# Patient Record
Sex: MALE | Race: WHITE | NOT HISPANIC OR LATINO | Employment: FULL TIME | URBAN - METROPOLITAN AREA
[De-identification: names, ages, dates, MRNs, and addresses within clinical notes are randomized per-mention and may not be internally consistent; named-entity substitution may affect disease eponyms.]

---

## 2017-01-23 DIAGNOSIS — Z30.9 ENCOUNTER FOR CONTRACEPTIVE MANAGEMENT, UNSPECIFIED CONTRACEPTIVE ENCOUNTER TYPE: ICD-10-CM

## 2017-01-23 LAB — SEMEN ANALYSIS P VAS PNL: ABNORMAL

## 2017-01-23 PROCEDURE — 89321 SEMEN ANAL SPERM DETECTION: CPT | Performed by: FAMILY MEDICINE

## 2017-03-20 DIAGNOSIS — Z30.2 ENCOUNTER FOR VASECTOMY: Primary | ICD-10-CM

## 2017-03-20 LAB — SEMEN ANALYSIS P VAS PNL: NORMAL

## 2017-03-20 PROCEDURE — 89321 SEMEN ANAL SPERM DETECTION: CPT | Performed by: FAMILY MEDICINE

## 2017-11-03 ENCOUNTER — ALLIED HEALTH/NURSE VISIT (OUTPATIENT)
Dept: FAMILY MEDICINE | Facility: CLINIC | Age: 37
End: 2017-11-03
Payer: COMMERCIAL

## 2017-11-03 DIAGNOSIS — Z23 NEED FOR PROPHYLACTIC VACCINATION AND INOCULATION AGAINST INFLUENZA: Primary | ICD-10-CM

## 2017-11-03 PROCEDURE — 90471 IMMUNIZATION ADMIN: CPT

## 2017-11-03 PROCEDURE — 90686 IIV4 VACC NO PRSV 0.5 ML IM: CPT

## 2017-11-03 PROCEDURE — 99207 ZZC NO CHARGE NURSE ONLY: CPT

## 2017-11-03 NOTE — MR AVS SNAPSHOT
After Visit Summary   11/3/2017    Nas Trejo    MRN: [id number]           Patient Information     Date Of Birth          1980        Visit Information        Provider Department      11/3/2017 9:10 AM [facility]        Today's Diagnoses     Need for prophylactic vaccination and inoculation against influenza    -  1       Follow-ups after your visit        Who to contact     If you have questions or need follow up information about today's clinic visit or your schedule please contact [facility] directly at [phone].  Normal or non-critical lab and imaging results will be communicated to you by AllergEasehart, letter or phone within 4 business days after the clinic has received the results. If you do not hear from us within 7 days, please contact the clinic through Invoice2go or phone. If you have a critical or abnormal lab result, we will notify you by phone as soon as possible.  Submit refill requests through Invoice2go or call your pharmacy and they will forward the refill request to us. Please allow 3 business days for your refill to be completed.          Additional Information About Your Visit        MyChart Information     Invoice2go gives you secure access to your electronic health record. If you see a primary care provider, you can also send messages to your care team and make appointments. If you have questions, please call your primary care clinic.  If you do not have a primary care provider, please call [phone] and they will assist you.        Care EveryWhere ID     This is your Care EveryWhere ID. This could be used by other organizations to access your [place] medical records  [id number]         Blood Pressure from Last 3 Encounters:   11/14/16 106/66   10/19/16 108/66   08/30/16 132/78    Weight from Last 3 Encounters:   11/14/16 171 lb (77.6 kg)   10/19/16 170 lb 3.2 oz (77.2 kg)   08/30/16 169 lb (76.7 kg)               We Performed the Following     FLU VAC, SPLIT VIRUS IM > 3 YO (QUADRIVALENT) [88984]     Vaccine Administration, Initial [85030]        Primary Care Provider Office Phone # Fax #    [facility] [phone] [address] [place] [address]        Equal Access to Services     [facility] Hospital[facility]UZ[facility] : Hadii aad ku hadasho Soomaali, waaxda luqadaha, qaybta kaalmada adeegyada, waxay charbelin hayaan adekaci sallymarybel pendleton . So [facility] [phone].    ATENCIÓN: Si habla español, tiene a goode disposición servicios gratuitos de asistencia lingüística. Maritza al [phone].    We comply with applicable federal civil rights laws and [place] laws. We do not discriminate on the basis of race, color, national origin, age, disability, sex, sexual orientation, or gender identity.            Thank you!     Thank you for choosing [facility]  for your care. Our goal is always to provide you with excellent care. Hearing back from our patients is one way we can continue to improve our services. Please take a few minutes to complete the written survey that you may receive in the mail after your visit with us. Thank you!             Your Updated Medication List - Protect others around you: Learn how to safely use, store and throw away your medicines at www.disposemymeds.org.          This list is accurate as of: 11/3/17  9:10 AM.  Always use your most recent med list.                   Brand Name Dispense Instructions for use Diagnosis    desoximetasone 0.25 % cream    TOPICORT    60 g    Apply sparingly to affected area twice daily    Eczema, unspecified type       ibuprofen 200 MG tablet    ADVIL/MOTRIN    20 tablet    Take 3 tablets by mouth every 8 hours as needed for pain.

## 2017-11-03 NOTE — PROGRESS NOTES

## 2018-10-22 ENCOUNTER — OFFICE VISIT (OUTPATIENT)
Dept: FAMILY MEDICINE | Facility: CLINIC | Age: 38
End: 2018-10-22
Payer: OTHER MISCELLANEOUS

## 2018-10-22 VITALS
RESPIRATION RATE: 18 BRPM | WEIGHT: 184.2 LBS | TEMPERATURE: 97.6 F | SYSTOLIC BLOOD PRESSURE: 112 MMHG | HEART RATE: 57 BPM | BODY MASS INDEX: 27.28 KG/M2 | DIASTOLIC BLOOD PRESSURE: 74 MMHG | OXYGEN SATURATION: 98 % | HEIGHT: 69 IN

## 2018-10-22 DIAGNOSIS — Z01.818 PREOP GENERAL PHYSICAL EXAM: Primary | ICD-10-CM

## 2018-10-22 LAB
BASOPHILS # BLD AUTO: 0 10E9/L (ref 0–0.2)
BASOPHILS NFR BLD AUTO: 0.4 %
CREAT SERPL-MCNC: 0.88 MG/DL (ref 0.66–1.25)
DIFFERENTIAL METHOD BLD: NORMAL
EOSINOPHIL # BLD AUTO: 0.2 10E9/L (ref 0–0.7)
EOSINOPHIL NFR BLD AUTO: 2.3 %
ERYTHROCYTE [DISTWIDTH] IN BLOOD BY AUTOMATED COUNT: 11.5 % (ref 10–15)
GFR SERPL CREATININE-BSD FRML MDRD: >90 ML/MIN/1.7M2
HCT VFR BLD AUTO: 45.9 % (ref 40–53)
HGB BLD-MCNC: 15.5 G/DL (ref 13.3–17.7)
LYMPHOCYTES # BLD AUTO: 2.1 10E9/L (ref 0.8–5.3)
LYMPHOCYTES NFR BLD AUTO: 29.1 %
MCH RBC QN AUTO: 30.9 PG (ref 26.5–33)
MCHC RBC AUTO-ENTMCNC: 33.8 G/DL (ref 31.5–36.5)
MCV RBC AUTO: 91 FL (ref 78–100)
MONOCYTES # BLD AUTO: 0.6 10E9/L (ref 0–1.3)
MONOCYTES NFR BLD AUTO: 8.7 %
NEUTROPHILS # BLD AUTO: 4.4 10E9/L (ref 1.6–8.3)
NEUTROPHILS NFR BLD AUTO: 59.5 %
PLATELET # BLD AUTO: 225 10E9/L (ref 150–450)
RBC # BLD AUTO: 5.02 10E12/L (ref 4.4–5.9)
WBC # BLD AUTO: 7.4 10E9/L (ref 4–11)

## 2018-10-22 PROCEDURE — 82565 ASSAY OF CREATININE: CPT | Performed by: FAMILY MEDICINE

## 2018-10-22 PROCEDURE — 36415 COLL VENOUS BLD VENIPUNCTURE: CPT | Performed by: FAMILY MEDICINE

## 2018-10-22 PROCEDURE — 85025 COMPLETE CBC W/AUTO DIFF WBC: CPT | Performed by: FAMILY MEDICINE

## 2018-10-22 PROCEDURE — 99214 OFFICE O/P EST MOD 30 MIN: CPT | Performed by: FAMILY MEDICINE

## 2018-10-22 NOTE — MR AVS SNAPSHOT
After Visit Summary   10/22/2018    Nas Trejo    MRN: [id number]           Patient Information     Date Of Birth          1980        Visit Information        Provider Department      10/22/2018 8:20 AM Gregg Medina MD [facility]        Today's Diagnoses     Preop general physical exam    -  1      Care Instructions      Before Your Surgery      Call your surgeon if there is any change in your health. This includes signs of a cold or flu (such as a sore throat, runny nose, cough, rash or fever).    Do not smoke, drink alcohol or take over the counter medicine (unless your surgeon or primary care doctor tells you to) for the 24 hours before and after surgery.    If you take prescribed drugs: Follow your doctor s orders about which medicines to take and which to stop until after surgery.    Eating and drinking prior to surgery: follow the instructions from your surgeon    Take a shower or bath the night before surgery. Use the soap your surgeon gave you to gently clean your skin. If you do not have soap from your surgeon, use your regular soap. Do not shave or scrub the surgery site.  Wear clean pajamas and have clean sheets on your bed.         Thank you for choosing [facility].  You may be receiving a survey in the mail from [facility] regarding your visit today.  Please take a few minutes to complete and return the survey to let us know how we are doing.      If you have questions or concerns, please contact us via castaclip or you can contact your care team at [phone].    Our Clinic hours are:  Monday 6:40 am  to 7:00 pm  Tuesday -Friday 6:40 am to 5:00 pm    The [place] outpatient lab hours are:  Monday - Friday 6:10 am to 4:45 pm  Saturdays 7:00 am to 11:00 am  Appointments are required, call [phone]      If you have clinical questions after hours or would like to schedule an appointment,  call the clinic at  [phone].        DIAGNOSTICS:     EKG: Not indicated due to non-vascular surgery and low risk of event (age <65 and without cardiac risk factors)  Labs Drawn and in Process:   Unresulted Labs Ordered in the Past 30 Days of this Admission     No orders found from 2018 to 10/23/2018.          No results for input(s): HGB, PLT, INR, NA, POTASSIUM, CR, A1C in the last 85888 hours.     IMPRESSION:   Reason for surgery/procedure: Nas Trejo (: 1980) presents for pre-operative evaluation assessment as requested by Dr. Chance. He requires evaluation and anesthesia risk assessment prior to undergoing surgery/procedure for treatment of Right Ankle Injury- 7/10/18 at work. Proposed Surgery/ Procedure: Right Ankle Arthroscopic repair of ankle ligament injury.     The proposed surgical procedure is considered LOW risk.    REVISED CARDIAC RISK INDEX  The patient has the following serious cardiovascular risks for perioperative complications such as (MI, PE, VFib and 3  AV Block):  No serious cardiac risks  INTERPRETATION: 0 risks: Class I (very low risk - 0.4% complication rate)    The patient has the following additional risks for perioperative complications:  No identified additional risks      ICD-10-CM    1. Preop general physical exam Z01.818        RECOMMENDATIONS:     --Patient is to take all scheduled medications on the day before surgery EXCEPT for modifications listed below.  Take no advil or aleve or aspirin from now til surgery. Tylenol is OK.   Your stomach should be empty for 8 hours before surgery.     APPROVAL GIVEN to proceed with proposed procedure, without further diagnostic evaluation          Follow-ups after your visit        Who to contact     If you have questions or need follow up information about today's clinic visit or your schedule please contact [facility] directly at [phone].  Normal or non-critical lab and imaging results will be communicated to  "you by MyChart, letter or phone within 4 business days after the clinic has received the results. If you do not hear from us within 7 days, please contact the clinic through Tacit Softwaret or phone. If you have a critical or abnormal lab result, we will notify you by phone as soon as possible.  Submit refill requests through [employer] or call your pharmacy and they will forward the refill request to us. Please allow 3 business days for your refill to be completed.          Additional Information About Your Visit        [facility] Information     [employer] gives you secure access to your electronic health record. If you see a primary care provider, you can also send messages to your care team and make appointments. If you have questions, please call your primary care clinic.  If you do not have a primary care provider, please call [phone] and they will assist you.        Care EveryWhere ID     This is your Care EveryWhere ID. This could be used by other organizations to access your [place] medical records  [id number]        Your Vitals Were     Pulse Temperature Respirations Height Pulse Oximetry BMI (Body Mass Index)    57 97.6  F (36.4  C) (Tympanic) 18 5' 9.25\" (1.759 m) 98% 27.01 kg/m2       Blood Pressure from Last 3 Encounters:   10/22/18 112/74   11/14/16 106/66   10/19/16 108/66    Weight from Last 3 Encounters:   10/22/18 184 lb 3.2 oz (83.6 kg)   11/14/16 171 lb (77.6 kg)   10/19/16 170 lb 3.2 oz (77.2 kg)              We Performed the Following     CBC with platelets differential     Creatinine        Primary Care Provider Office Phone # Fax #    Gregg Medina [phone] [address] [id number]        Equal Access to Services     DALLIN ALATORRE : Colin Aggarwal, dagoberto perea, david pappasalmada tasha, hakan sampson. So [facility] [phone].    ATENCIÓN: Si habla español, tiene a goode disposición servicios gratuitos de asistencia " lingüística. Maritza al [phone].    We comply with applicable federal civil rights laws and [place] laws. We do not discriminate on the basis of race, color, national origin, age, disability, sex, sexual orientation, or gender identity.            Thank you!     Thank you for choosing [facility]  for your care. Our goal is always to provide you with excellent care. Hearing back from our patients is one way we can continue to improve our services. Please take a few minutes to complete the written survey that you may receive in the mail after your visit with us. Thank you!             Your Updated Medication List - Protect others around you: Learn how to safely use, store and throw away your medicines at www.disposemymeds.org.          This list is accurate as of 10/22/18  9:07 AM.  Always use your most recent med list.                   Brand Name Dispense Instructions for use Diagnosis    desoximetasone 0.25 % cream    TOPICORT    60 g    Apply sparingly to affected area twice daily    Eczema, unspecified type       ibuprofen 200 MG tablet    ADVIL/MOTRIN    20 tablet    Take 3 tablets by mouth every 8 hours as needed for pain.

## 2018-10-22 NOTE — PROGRESS NOTES
[facility]  [address] [address]  [phone]  Dept: [phone]    WORK COMP   PRE-OP EVALUATION:  Today's date: 10/22/2018    Naskenzie Trejo (: 1980) presents for pre-operative evaluation assessment as requested by Dr. Chance. He requires evaluation and anesthesia risk assessment prior to undergoing surgery/procedure for treatment of Right Ankle Injury- 7/10/18 at work. Proposed Surgery/ Procedure: Right Ankle Arthroscopic repair of ankle ligament injury.       Date of Surgery/ Procedure: 10/31/2018  Time of Surgery/ Procedure: 1:45 p.m.  Hospital/Surgical Facility: [facility].   Fax number for surgical facility: [phone]  Primary Physician: Dr. Medina   Type of Anesthesia Anticipated: General    Patient has a Health Care Directive or Living Will:  NO    1. NO - Do you have a history of heart attack, stroke, stent, bypass or surgery on an artery in the head, neck, heart or legs?  2. NO - Do you ever have any pain or discomfort in your chest?  3. NO - Do you have a history of  Heart Failure?  4. NO - Are you troubled by shortness of breath when: walking on the level, up a slight hill or at night?  5. NO - Do you currently have a cold, bronchitis or other respiratory infection?  6. NO - Do you have a cough, shortness of breath or wheezing?  7. NO - Do you sometimes get pains in the calves of your legs when you walk?  8. NO - Do you or anyone in your family have previous history of blood clots?  9. NO - Do you or does anyone in your family have a serious bleeding problem such as prolonged bleeding following surgeries or cuts?  10. NO - Have you ever had problems with anemia or been told to take iron pills?  11. NO - Have you had any abnormal blood loss such as black, tarry or bloody stools, or abnormal vaginal bleeding?  12. NO - Have you ever had a blood transfusion?  13. NO - Have you or any of your relatives ever had  "problems with anesthesia?  14. NO - Do you have sleep apnea, excessive snoring or daytime drowsiness?  15. NO - Do you have any prosthetic heart valves?  16. NO - Do you have prosthetic joints?      HPI:     HPI related to upcoming procedure: see above.       See problem list for active medical problems.  Problems all longstanding and stable, except as noted/documented.  See ROS for pertinent symptoms related to these conditions.                                                                                                                                                          .    MEDICAL HISTORY:     Patient Active Problem List    Diagnosis Date Noted     Eczema, unspecified type 10/19/2016     Priority: Medium      History reviewed. No pertinent past medical history.  Past Surgical History:   Procedure Laterality Date     ENT SURGERY       HERNIA REPAIR  01/01/2006     TONSILLECTOMY       Current Outpatient Prescriptions   Medication Sig Dispense Refill     desoximetasone (TOPICORT) 0.25 % cream Apply sparingly to affected area twice daily 60 g 3     ibuprofen (ADVIL,MOTRIN) 200 MG tablet Take 3 tablets by mouth every 8 hours as needed for pain. 20 tablet 0     OTC products: None, except as noted above    No Known Allergies   Latex Allergy: NO    Social History   Substance Use Topics     Smoking status: Former Smoker     Packs/day: 0.50     Quit date: 1/1/2018     Smokeless tobacco: Former User     Alcohol use Yes      Comment: social     History   Drug Use No       REVIEW OF SYSTEMS:   CONSTITUTIONAL: NEGATIVE for fever, chills, change in weight  ENT/MOUTH: NEGATIVE for ear, mouth and throat problems  RESP: NEGATIVE for significant cough or SOB  CV: NEGATIVE for chest pain, palpitations or peripheral edema    EXAM:   /74 (BP Location: Left arm, Patient Position: Chair, Cuff Size: Adult Regular)  Pulse 57  Temp 97.6  F (36.4  C) (Tympanic)  Resp 18  Ht 5' 9.25\" (1.759 m)  Wt 184 lb 3.2 oz (83.6 kg) "  SpO2 98%  BMI 27.01 kg/m2  Exam:  GENERAL APPEARANCE: healthy, alert and no distress  EYES: EOMI,  PERRL  HENT: ear canals and TM's normal and nose and mouth without ulcers or lesions  NECK: no adenopathy, no asymmetry, masses, or scars and thyroid normal to palpation  RESP: lungs clear to auscultation - no rales, rhonchi or wheezes  CV: regular rates and rhythm, normal S1 S2, no S3 or S4 and no murmur, click or rub -  ABDOMEN:  soft, nontender, no HSM or masses and bowel sounds normal  GU_male: testicles normal without atrophy or masses, no hernias and penis normal without urethral discharge  MS:  The feet are with normal pulses and sensation.   SKIN: no suspicious lesions or rashes  NEURO: Normal strength and tone, sensory exam grossly normal, mentation intact and speech normal  PSYCH: mentation appears normal and affect normal/bright  LYMPHATICS: No axillary, cervical, inguinal, or supraclavicular nodes      DIAGNOSTICS:     EKG: Not indicated due to non-vascular surgery and low risk of event (age <65 and without cardiac risk factors)  Labs Drawn and in Process:   Unresulted Labs Ordered in the Past 30 Days of this Admission     No orders found from 2018 to 10/23/2018.          No results for input(s): HGB, PLT, INR, NA, POTASSIUM, CR, A1C in the last 11589 hours.     IMPRESSION:   Reason for surgery/procedure: Nas Trejo (: 1980) presents for pre-operative evaluation assessment as requested by Dr. Chance. He requires evaluation and anesthesia risk assessment prior to undergoing surgery/procedure for treatment of Right Ankle Injury- 7/10/18 at work. Proposed Surgery/ Procedure: Right Ankle Arthroscopic repair of ankle ligament injury.     The proposed surgical procedure is considered LOW risk.    REVISED CARDIAC RISK INDEX  The patient has the following serious cardiovascular risks for perioperative complications such as (MI, PE, VFib and 3  AV Block):  No serious cardiac  risks  INTERPRETATION: 0 risks: Class I (very low risk - 0.4% complication rate)    The patient has the following additional risks for perioperative complications:  No identified additional risks      ICD-10-CM    1. Preop general physical exam Z01.818        RECOMMENDATIONS:     --Patient is to take all scheduled medications on the day before surgery EXCEPT for modifications listed below.  Take no advil or aleve or aspirin from now til surgery. Tylenol is OK.   Your stomach should be empty for 8 hours before surgery.     APPROVAL GIVEN to proceed with proposed procedure, without further diagnostic evaluation       Signed Electronically by: Gregg Medina MD    Copy of this evaluation report is provided to requesting physician.    [facility] Preop Guidelines    Revised Cardiac Risk Index

## 2018-10-22 NOTE — PATIENT INSTRUCTIONS
Before Your Surgery      Call your surgeon if there is any change in your health. This includes signs of a cold or flu (such as a sore throat, runny nose, cough, rash or fever).    Do not smoke, drink alcohol or take over the counter medicine (unless your surgeon or primary care doctor tells you to) for the 24 hours before and after surgery.    If you take prescribed drugs: Follow your doctor s orders about which medicines to take and which to stop until after surgery.    Eating and drinking prior to surgery: follow the instructions from your surgeon    Take a shower or bath the night before surgery. Use the soap your surgeon gave you to gently clean your skin. If you do not have soap from your surgeon, use your regular soap. Do not shave or scrub the surgery site.  Wear clean pajamas and have clean sheets on your bed.         Thank you for choosing [facility].  You may be receiving a survey in the mail from [facility] regarding your visit today.  Please take a few minutes to complete and return the survey to let us know how we are doing.      If you have questions or concerns, please contact us via JoggleBug or you can contact your care team at [phone].    Our Clinic hours are:  Monday 6:40 am  to 7:00 pm  Tuesday -Friday 6:40 am to 5:00 pm    The [place] outpatient lab hours are:  Monday - Friday 6:10 am to 4:45 pm  Saturdays 7:00 am to 11:00 am  Appointments are required, call [phone]      If you have clinical questions after hours or would like to schedule an appointment,  call the clinic at [phone].        DIAGNOSTICS:     EKG: Not indicated due to non-vascular surgery and low risk of event (age <65 and without cardiac risk factors)  Labs Drawn and in Process:   Unresulted Labs Ordered in the Past 30 Days of this Admission     No orders found from 8/23/2018 to 10/23/2018.          No results for input(s): HGB, PLT, INR, NA, POTASSIUM, CR, A1C in the last 99281 hours.     IMPRESSION:    Reason for surgery/procedure: Nas Trejo (: 1980) presents for pre-operative evaluation assessment as requested by Dr. Chance. He requires evaluation and anesthesia risk assessment prior to undergoing surgery/procedure for treatment of Right Ankle Injury- 7/10/18 at work. Proposed Surgery/ Procedure: Right Ankle Arthroscopic repair of ankle ligament injury.     The proposed surgical procedure is considered LOW risk.    REVISED CARDIAC RISK INDEX  The patient has the following serious cardiovascular risks for perioperative complications such as (MI, PE, VFib and 3  AV Block):  No serious cardiac risks  INTERPRETATION: 0 risks: Class I (very low risk - 0.4% complication rate)    The patient has the following additional risks for perioperative complications:  No identified additional risks      ICD-10-CM    1. Preop general physical exam Z01.818        RECOMMENDATIONS:     --Patient is to take all scheduled medications on the day before surgery EXCEPT for modifications listed below.  Take no advil or aleve or aspirin from now til surgery. Tylenol is OK.   Your stomach should be empty for 8 hours before surgery.     APPROVAL GIVEN to proceed with proposed procedure, without further diagnostic evaluation

## 2018-10-31 ENCOUNTER — TRANSFERRED RECORDS (OUTPATIENT)
Dept: HEALTH INFORMATION MANAGEMENT | Facility: CLINIC | Age: 38
End: 2018-10-31

## 2019-03-28 ENCOUNTER — TELEPHONE (OUTPATIENT)
Dept: FAMILY MEDICINE | Facility: CLINIC | Age: 39
End: 2019-03-28

## 2019-12-09 ENCOUNTER — HEALTH MAINTENANCE LETTER (OUTPATIENT)
Age: 39
End: 2019-12-09

## 2019-12-26 ENCOUNTER — NURSE TRIAGE (OUTPATIENT)
Dept: FAMILY MEDICINE | Facility: CLINIC | Age: 39
End: 2019-12-26

## 2019-12-26 NOTE — TELEPHONE ENCOUNTER
Patient c/o upper left chest pain close to shoulder (but not shoulder per patient) on kameron holm that lasted approximately 1 hour-self resolved. Has not had symptoms since, no symptoms present at time of call. Had similar issue in the past (2015). 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 BP at home in normal range. No other symptoms.     Additional Information    Negative: Severe difficulty breathing (e.g., struggling for each breath, speaks in single words)    Negative: Passed out (i.e., fainted, collapsed and was not responding)    Negative: Chest pain lasting longer than 5 minutes and ANY of the following:* Over 50 years old* Over 30 years old and at least one cardiac risk factor (i.e., high blood pressure, diabetes, high cholesterol, obesity, smoker or strong family history of heart disease)* Pain is crushing, pressure-like, or heavy * Took nitroglycerin and chest pain was not relieved* History of heart disease (i.e., angina, heart attack, bypass surgery, angioplasty, CHF)    Negative: Visible sweat on face or sweat dripping down face    Negative: Sounds like a life-threatening emergency to the triager    Negative: SEVERE chest pain    Negative: Pain also present in shoulder(s) or arm(s) or jaw    Negative: Difficulty breathing    Negative: Cocaine use within last 3 days    Negative: History of prior 'blood clot' in leg or lungs (i.e., deep vein thrombosis, pulmonary embolism)    Negative: Recent illness requiring prolonged bed rest (i.e., immobilization)    Negative: Hip or leg fracture in past 2 months (e.g, or had cast on leg or ankle)    Negative: Major surgery in the past month    Negative: Recent long-distance travel with prolonged time in car, bus, plane, or train (i.e., within past 2 weeks; 6 or more hours duration)    Negative: Heart beating irregularly or very rapidly    Negative: Chest pain lasting longer than 5 minutes     Negative: Intermittent chest pain and pain has been increasing in severity or frequency    Negative: Dizziness or lightheadedness    Negative: Coughing up blood    Negative: Patient sounds very sick or weak to the triager    Negative: Fever > 100.5 F (38.1 C)    Negative: Intermittent chest pains persist > 3 days    Negative: All other patients with chest pain    Negative: Patient wants to be seen    Protocols used: CHEST PAIN-A-OH

## 2019-12-27 ENCOUNTER — OFFICE VISIT (OUTPATIENT)
Dept: FAMILY MEDICINE | Facility: CLINIC | Age: 39
End: 2019-12-27
Payer: COMMERCIAL

## 2019-12-27 VITALS
DIASTOLIC BLOOD PRESSURE: 78 MMHG | SYSTOLIC BLOOD PRESSURE: 120 MMHG | WEIGHT: 188.8 LBS | RESPIRATION RATE: 16 BRPM | HEART RATE: 82 BPM | BODY MASS INDEX: 27.96 KG/M2 | HEIGHT: 69 IN | OXYGEN SATURATION: 98 % | TEMPERATURE: 98.3 F

## 2019-12-27 DIAGNOSIS — R07.9 CHEST PAIN, UNSPECIFIED TYPE: Primary | ICD-10-CM

## 2019-12-27 DIAGNOSIS — L72.3 SEBACEOUS CYST: ICD-10-CM

## 2019-12-27 PROCEDURE — 99214 OFFICE O/P EST MOD 30 MIN: CPT | Performed by: FAMILY MEDICINE

## 2019-12-27 ASSESSMENT — MIFFLIN-ST. JEOR: SCORE: 1771.77

## 2019-12-27 NOTE — PROGRESS NOTES
"Subjective     Nas Jordon Trejo is a 39 year old male who presents to clinic today for the following health issues:    HPI   Chief Complaint   Patient presents with     Derm Problem     lump in left sided groin area X 3-4 weeks.      Chest Pain     ? chest muscle, chest pain pt. called and talked with triage nurse yesterday 12/26/2019 (see note)         Current Outpatient Medications:      desoximetasone (TOPICORT) 0.25 % cream, Apply sparingly to affected area twice daily, Disp: 60 g, Rfl: 3     ibuprofen (ADVIL,MOTRIN) 200 MG tablet, Take 3 tablets by mouth every 8 hours as needed for pain. (Patient not taking: Reported on 12/27/2019), Disp: 20 tablet, Rfl: 0    Patient Active Problem List   Diagnosis     Eczema, unspecified type       Blood pressure 120/78, pulse 82, temperature 98.3  F (36.8  C), temperature source Tympanic, resp. rate 16, height 1.753 m (5' 9\"), weight 85.6 kg (188 lb 12.8 oz), SpO2 98 %.    Exam:  GENERAL APPEARANCE: healthy, alert and no distress  EYES: EOMI,  PERRL  NECK: no adenopathy, no asymmetry, masses, or scars and thyroid normal to palpation  RESP: lungs clear to auscultation - no rales, rhonchi or wheezes  CV: regular rates and rhythm, normal S1 S2, no S3 or S4 and no murmur, click or rub -  MS: the ribs and the sternum are not tender. Stressing the chest muscles does not reproduce the pain.   SKIN: no suspicious lesions or rashes; except the left inguinal area there is a 1 cm sebaceous cyst and this is not inflamed.   PSYCH: mentation appears normal and affect normal/bright    (R07.9) Chest pain, unspecified type  (primary encounter diagnosis)  Comment:   Plan: Exercise Stress Test - Adult        We discussed some of the possible causes and start Aspirin at 325 mg daily. Avoid heavy exertion until after the stress test report is done.   Call [phone] or go to Cardiology now to schedule the stress test. The results will be called to you. If this is normal then other causes " will be sought.   If abnormal then you will see Cardiology.     (L72.3) Sebaceous cyst  Comment:   Plan: For the left groin area swelling, this appears as a plugged oil gland, or sebaceous cyst. Monitor for infection and if you want it excised then call our RN at [phone] and the referral to our surgeons would be done by phone.     Gregg Medina MD

## 2019-12-27 NOTE — PATIENT INSTRUCTIONS
(R07.9) Chest pain, unspecified type  (primary encounter diagnosis)  Comment:   Plan: Exercise Stress Test - Adult        We discussed some of the possible causes and start Aspirin at 325 mg daily. Avoid heavy exertion until after the stress test report is done.   Call [phone] or go to Cardiology now to schedule the stress test. The results will be called to you. If this is normal then other causes will be sought.   If abnormal then you will see Cardiology.     (L72.3) Sebaceous cyst  Comment:   Plan: For the left groin area swelling, this appears as a plugged oil gland, or sebaceous cyst. Monitor for infection and if you want it excised then call our RN at [phone] and the referral to our surgeons would be done by phone.

## 2019-12-28 ENCOUNTER — HOSPITAL ENCOUNTER (EMERGENCY)
Facility: CLINIC | Age: 39
Discharge: HOME | End: 2019-12-28
Attending: STUDENT IN AN ORGANIZED HEALTH CARE EDUCATION/TRAINING PROGRAM | Admitting: STUDENT IN AN ORGANIZED HEALTH CARE EDUCATION/TRAINING PROGRAM
Payer: COMMERCIAL

## 2019-12-28 ENCOUNTER — NURSE TRIAGE (OUTPATIENT)
Dept: NURSING | Facility: CLINIC | Age: 39
End: 2019-12-28

## 2019-12-28 ENCOUNTER — APPOINTMENT (OUTPATIENT)
Dept: GENERAL RADIOLOGY | Facility: CLINIC | Age: 39
End: 2019-12-28
Attending: STUDENT IN AN ORGANIZED HEALTH CARE EDUCATION/TRAINING PROGRAM
Payer: COMMERCIAL

## 2019-12-28 VITALS
WEIGHT: 185 LBS | HEART RATE: 67 BPM | RESPIRATION RATE: 19 BRPM | HEIGHT: 69 IN | OXYGEN SATURATION: 96 % | SYSTOLIC BLOOD PRESSURE: 123 MMHG | DIASTOLIC BLOOD PRESSURE: 77 MMHG | TEMPERATURE: 97.8 F | BODY MASS INDEX: 27.4 KG/M2

## 2019-12-28 DIAGNOSIS — R07.89 ATYPICAL CHEST PAIN: ICD-10-CM

## 2019-12-28 LAB
ALBUMIN SERPL-MCNC: 4.1 G/DL (ref 3.4–5)
ALP SERPL-CCNC: 68 U/L (ref 40–150)
ALT SERPL W P-5'-P-CCNC: 78 U/L (ref 0–70)
ANION GAP SERPL CALCULATED.3IONS-SCNC: 3 MMOL/L (ref 3–14)
AST SERPL W P-5'-P-CCNC: 65 U/L (ref 0–45)
BASOPHILS # BLD AUTO: 0.1 10E9/L (ref 0–0.2)
BASOPHILS NFR BLD AUTO: 0.6 %
BILIRUB SERPL-MCNC: 0.3 MG/DL (ref 0.2–1.3)
BUN SERPL-MCNC: 12 MG/DL (ref 7–30)
CALCIUM SERPL-MCNC: 8.8 MG/DL (ref 8.5–10.1)
CHLORIDE SERPL-SCNC: 105 MMOL/L (ref 94–109)
CO2 SERPL-SCNC: 30 MMOL/L (ref 20–32)
CREAT SERPL-MCNC: 0.72 MG/DL (ref 0.66–1.25)
D DIMER PPP FEU-MCNC: <0.3 UG/ML FEU (ref 0–0.5)
DIFFERENTIAL METHOD BLD: NORMAL
EOSINOPHIL # BLD AUTO: 0.3 10E9/L (ref 0–0.7)
EOSINOPHIL NFR BLD AUTO: 3.2 %
ERYTHROCYTE [DISTWIDTH] IN BLOOD BY AUTOMATED COUNT: 11.6 % (ref 10–15)
GFR SERPL CREATININE-BSD FRML MDRD: >90 ML/MIN/{1.73_M2}
GLUCOSE SERPL-MCNC: 89 MG/DL (ref 70–99)
HCT VFR BLD AUTO: 44.5 % (ref 40–53)
HGB BLD-MCNC: 15 G/DL (ref 13.3–17.7)
IMM GRANULOCYTES # BLD: 0 10E9/L (ref 0–0.4)
IMM GRANULOCYTES NFR BLD: 0.2 %
LYMPHOCYTES # BLD AUTO: 3.2 10E9/L (ref 0.8–5.3)
LYMPHOCYTES NFR BLD AUTO: 36.5 %
MCH RBC QN AUTO: 31.1 PG (ref 26.5–33)
MCHC RBC AUTO-ENTMCNC: 33.7 G/DL (ref 31.5–36.5)
MCV RBC AUTO: 92 FL (ref 78–100)
MONOCYTES # BLD AUTO: 0.7 10E9/L (ref 0–1.3)
MONOCYTES NFR BLD AUTO: 8 %
NEUTROPHILS # BLD AUTO: 4.6 10E9/L (ref 1.6–8.3)
NEUTROPHILS NFR BLD AUTO: 51.5 %
NRBC # BLD AUTO: 0 10*3/UL
NRBC BLD AUTO-RTO: 0 /100
PLATELET # BLD AUTO: 268 10E9/L (ref 150–450)
POTASSIUM SERPL-SCNC: 5.1 MMOL/L (ref 3.4–5.3)
PROT SERPL-MCNC: 7.8 G/DL (ref 6.8–8.8)
RBC # BLD AUTO: 4.83 10E12/L (ref 4.4–5.9)
SODIUM SERPL-SCNC: 138 MMOL/L (ref 133–144)
TROPONIN I SERPL-MCNC: <0.015 UG/L (ref 0–0.04)
WBC # BLD AUTO: 8.8 10E9/L (ref 4–11)

## 2019-12-28 PROCEDURE — 84484 ASSAY OF TROPONIN QUANT: CPT | Performed by: STUDENT IN AN ORGANIZED HEALTH CARE EDUCATION/TRAINING PROGRAM

## 2019-12-28 PROCEDURE — 85379 FIBRIN DEGRADATION QUANT: CPT | Performed by: STUDENT IN AN ORGANIZED HEALTH CARE EDUCATION/TRAINING PROGRAM

## 2019-12-28 PROCEDURE — 25000132 ZZH RX MED GY IP 250 OP 250 PS 637: Performed by: STUDENT IN AN ORGANIZED HEALTH CARE EDUCATION/TRAINING PROGRAM

## 2019-12-28 PROCEDURE — 80053 COMPREHEN METABOLIC PANEL: CPT | Performed by: STUDENT IN AN ORGANIZED HEALTH CARE EDUCATION/TRAINING PROGRAM

## 2019-12-28 PROCEDURE — 85025 COMPLETE CBC W/AUTO DIFF WBC: CPT | Performed by: STUDENT IN AN ORGANIZED HEALTH CARE EDUCATION/TRAINING PROGRAM

## 2019-12-28 PROCEDURE — 93010 ELECTROCARDIOGRAM REPORT: CPT | Mod: Z6 | Performed by: STUDENT IN AN ORGANIZED HEALTH CARE EDUCATION/TRAINING PROGRAM

## 2019-12-28 PROCEDURE — 71046 X-RAY EXAM CHEST 2 VIEWS: CPT

## 2019-12-28 PROCEDURE — 93005 ELECTROCARDIOGRAM TRACING: CPT | Performed by: STUDENT IN AN ORGANIZED HEALTH CARE EDUCATION/TRAINING PROGRAM

## 2019-12-28 PROCEDURE — 99285 EMERGENCY DEPT VISIT HI MDM: CPT | Mod: 25 | Performed by: STUDENT IN AN ORGANIZED HEALTH CARE EDUCATION/TRAINING PROGRAM

## 2019-12-28 RX ORDER — ASPIRIN 81 MG/1
324 TABLET, CHEWABLE ORAL ONCE
Status: COMPLETED | OUTPATIENT
Start: 2019-12-28 | End: 2019-12-28

## 2019-12-28 RX ADMIN — ASPIRIN 81 MG 324 MG: 81 TABLET ORAL at 22:27

## 2019-12-28 ASSESSMENT — MIFFLIN-ST. JEOR: SCORE: 1754.53

## 2019-12-28 NOTE — ED AVS SNAPSHOT
[facility]  [address] [address]  Phone:  [phone]  Fax:  [phone]                                    Nas Trejo   MRN: [id number]    Department:  [facility]   Date of Visit:  12/28/2019           After Visit Summary Signature Page    I have received my discharge instructions, and my questions have been answered. I have discussed any challenges I see with this plan with the nurse or doctor.    ..........................................................................................................................................  Patient/Patient Representative Signature      ..........................................................................................................................................  Patient Representative Print Name and Relationship to Patient    ..................................................               ................................................  Date                                   Time    ..........................................................................................................................................  Reviewed by Signature/Title    ...................................................              ..............................................  Date                                               Time          22EPIC Rev 08/18

## 2019-12-29 NOTE — ED PROVIDER NOTES
History     Chief Complaint   Patient presents with     Chest Pain     HPI  Nas Jordon Trejo is a 39 year old male who resents department for evaluation of intermittent left-sided chest discomfort several days duration.  Patient states that symptoms began initially on 2019, have been present nearly every day at some point in time but often only lasting minutes to hours.  The pain is described as sharp and achy but not reproducible with range of motion or deep inspiration.  As fever, chills, cough, shortness of breath, back pain, substernal chest pressure, abdominal pain, nausea or vomiting.  This evening his symptoms were associated with belching, he took OTC Maalox with relief from the belching but his sharp left-sided chest discomfort persisted.  No known history of CAD or VTE.    Allergies:  No Known Allergies    Problem List:    Patient Active Problem List    Diagnosis Date Noted     Eczema, unspecified type 10/19/2016     Priority: Medium        Past Medical History:    No past medical history on file.    Past Surgical History:    Past Surgical History:   Procedure Laterality Date     ENT SURGERY       HERNIA REPAIR  2006     TONSILLECTOMY         Family History:    Family History   Problem Relation Age of Onset     Diabetes Father         type 2     Thyroid Disease Sister        Social History:  Marital Status:   [2]  Social History     Tobacco Use     Smoking status: Former Smoker     Packs/day: 0.50     Last attempt to quit: 2018     Years since quittin.9     Smokeless tobacco: Current User     Types: Chew   Substance Use Topics     Alcohol use: Yes     Comment: social     Drug use: No        Medications:    desoximetasone (TOPICORT) 0.25 % cream  ibuprofen (ADVIL,MOTRIN) 200 MG tablet          Review of Systems  Constitutional:  Negative for fever or recent illness.  Cardiovascular: Positive for intermittent sharp left-sided chest pain.  Respiratory:  Negative for  "cough or shortness of breath.  Gastrointestinal:  Negative for abdominal pain, nausea or vomiting.  Musculoskeletal: Negative for neck or back pain.  Neurological:  Negative for headache or dizziness.    All others reviewed and are negative.      Physical Exam   BP: (!) 160/94  Pulse: 81  Heart Rate: 76  Temp: 97.8  F (36.6  C)  Resp: 18  Height: 175.3 cm (5' 9\")  Weight: 83.9 kg (185 lb)  SpO2: 99 %      Physical Exam  Constitutional:  Well developed, well nourished.  Appears nontoxic and in no acute distress.    HENT:  Normocephalic and atraumatic.  Symmetric in appearance.  Eyes:  Conjunctivae are normal.  Neck:  Neck supple.  Cardiovascular:  No cyanosis.  RRR.  No audible murmurs noted.  No lower extremity edema or asymmetry.   Respiratory:  Effort normal without sign of respiratory distress.  CTAB without diminished regions.    Gastrointestinal:  Soft nondistended abdomen.  Nontender and without guarding.  No rigidity or rebound tenderness.  Negative Das's sign.  Negative McBurney's point.    Musculoskeletal:  Moves extremities spontaneously.  Neurological:  Patient is alert.  Skin:  Skin is warm and dry.  Psychiatric:  Normal mood and affect.      ED Course        Procedures                 EKG Interpretation:      Interpreted by: Fox Castillo  Time reviewed: Upon completion    Symptoms at time of EKG: Sharp chest discomfort  Rhythm: Sinus  Rate: Normal  Axis: Normal    Conduction: None atypical   ST Segments/ T Waves: No pathologic ST-elevations or T-wave abnormalities.  Q Waves: None  Comparison to prior: Similar morphology to previous     Clinical Impression: No sign of ischemia       Critical Care time:  none               Results for orders placed or performed during the hospital encounter of 12/28/19 (from the past 24 hour(s))   CBC with platelets differential   Result Value Ref Range    WBC 8.8 4.0 - 11.0 10e9/L    RBC Count 4.83 4.4 - 5.9 10e12/L    Hemoglobin 15.0 13.3 - 17.7 g/dL    Hematocrit " 44.5 40.0 - 53.0 %    MCV 92 78 - 100 fl    MCH 31.1 26.5 - 33.0 pg    MCHC 33.7 31.5 - 36.5 g/dL    RDW 11.6 10.0 - 15.0 %    Platelet Count 268 150 - 450 10e9/L    Diff Method Automated Method     % Neutrophils 51.5 %    % Lymphocytes 36.5 %    % Monocytes 8.0 %    % Eosinophils 3.2 %    % Basophils 0.6 %    % Immature Granulocytes 0.2 %    Nucleated RBCs 0 0 /100    Absolute Neutrophil 4.6 1.6 - 8.3 10e9/L    Absolute Lymphocytes 3.2 0.8 - 5.3 10e9/L    Absolute Monocytes 0.7 0.0 - 1.3 10e9/L    Absolute Eosinophils 0.3 0.0 - 0.7 10e9/L    Absolute Basophils 0.1 0.0 - 0.2 10e9/L    Abs Immature Granulocytes 0.0 0 - 0.4 10e9/L    Absolute Nucleated RBC 0.0    Comprehensive metabolic panel   Result Value Ref Range    Sodium 138 133 - 144 mmol/L    Potassium 5.1 3.4 - 5.3 mmol/L    Chloride 105 94 - 109 mmol/L    Carbon Dioxide 30 20 - 32 mmol/L    Anion Gap 3 3 - 14 mmol/L    Glucose 89 70 - 99 mg/dL    Urea Nitrogen 12 7 - 30 mg/dL    Creatinine 0.72 0.66 - 1.25 mg/dL    GFR Estimate >90 >60 mL/min/[1.73_m2]    GFR Estimate If Black >90 >60 mL/min/[1.73_m2]    Calcium 8.8 8.5 - 10.1 mg/dL    Bilirubin Total 0.3 0.2 - 1.3 mg/dL    Albumin 4.1 3.4 - 5.0 g/dL    Protein Total 7.8 6.8 - 8.8 g/dL    Alkaline Phosphatase 68 40 - 150 U/L    ALT 78 (H) 0 - 70 U/L    AST 65 (H) 0 - 45 U/L   Troponin I   Result Value Ref Range    Troponin I ES <0.015 0.000 - 0.045 ug/L   D dimer quantitative   Result Value Ref Range    D Dimer <0.3 0.0 - 0.50 ug/ml FEU   XR Chest 2 Views    Narrative    XR CHEST 2 VW   12/28/2019 10:59 PM     HISTORY: Sharp left-sided pain.    COMPARISON: 10/5/2015.    FINDINGS: The heart size is normal. The lungs are clear. No  pneumothorax or pleural effusion.      Impression    IMPRESSION: No acute abnormality.    EDENILSON PAYTON MD       Medications   aspirin (ASA) chewable tablet 324 mg (324 mg Oral Given 12/28/19 0144)       Assessments & Plan (with Medical Decision Making)   Nas Dahl  Neil is a 39 year old male who presents to the department for evaluation of several days of intermittent left-sided chest discomfort.  The pain is described as sharp and achy, nonradiating but pleuritic.  There is no exertional component, atypical for ACS and troponin within reference range despite duration of symptoms.  He is without known history of VTE or signs of DVT, d-dimer within reference range.  Chest radiograph independently reviewed and without sign of pneumothorax or widened mediastinum.  Low suspicion for etiology such as pulmonary embolism, aortic dissection, pericardial effusion, pleural effusion, pneumonia or infectious process.  Symptoms could represent pleurisy precordial catch syndrome, recommend OTC NSAIDs as directed as well as follow through with previously ordered stress testing and clinic appointment.  He is in agreement discharge line including return instructions.        Disclaimer:  This note consists of symbols derived from keyboarding, dictation, and/or voice recognition software.  As a result, there may be errors in the script that have gone undetected.  Please consider this when interpreting information found in the chart.        I have reviewed the nursing notes.    I have reviewed the findings, diagnosis, plan and need for follow up with the patient.       Discharge Medication List as of 12/28/2019 11:43 PM          Final diagnoses:   Atypical chest pain       12/28/2019   [facility] EMERGENCY DEPARTMENT     Fox Castillo,   12/29/19 0122

## 2019-12-29 NOTE — TELEPHONE ENCOUNTER
Pt c/o heartburn sensation w/ a lot of burping today. Saw PCP yesterday in clinic for chest discomfort (see 12/27 OV note) Cardiac stress test scheduled Jan 7. He denies any SOB, weakness, lightheadedness, nausea or vomiting. Says this upper abdomen/chest pain is constant but no worse since visit. Only change is the frequent burping. Pt asks if there is some type of antacid he could try to relieve this. Advised try Maalox, Mylanta or Tums now but if no relief w/ this then see provider w/i 4 hrs (would have to be ED as it is Sat night) Pt voiced understanding and agreement.        Reason for Disposition    [1] MILD-MODERATE pain AND [2] constant AND [3] present > 2 hours    Additional Information    Negative: Severe difficulty breathing (e.g., struggling for each breath, speaks in single words)    Negative: Shock suspected (e.g., cold/pale/clammy skin, too weak to stand, low BP, rapid pulse)    Negative: Difficult to awaken or acting confused (e.g., disoriented, slurred speech)    Negative: Passed out (i.e., lost consciousness, collapsed and was not responding)    Negative: Visible sweat on face or sweat dripping down face    Negative: Sounds like a life-threatening emergency to the triager    Negative: Followed an abdomen (stomach) injury    Negative: Chest pain    Negative: [1] SEVERE pain (e.g., excruciating) AND [2] present > 1 hour    Negative: [1] Pain lasts > 10 minutes AND [2] age > 50    Negative: [1] Pain lasts > 10 minutes AND [2] age > 40 AND [3] associated chest, arm, neck, upper back or jaw pain    Negative: [1] Pain lasts > 10 minutes AND [2] age > 35 AND [3] at least one cardiac risk factor (i.e., hypertension, diabetes, obesity, smoker or strong family history of heart disease)    Negative: [1] Pain lasts > 10 minutes AND [2] history of heart disease (i.e., heart attack, bypass surgery, angina, angioplasty, CHF; not just a heart murmur)    Negative: [1] Pain lasts > 10 minutes AND [2] difficulty  "breathing    Negative: [1] Vomiting AND [2] contains red blood  (Exception: few streaks and only occurred once)    Negative: [1] Vomiting AND [2] contains black (\"coffee ground\") material    Negative: Blood in bowel movements  (Exception: Blood on surface of BM with constipation)    Negative: Black or tarry bowel movements  (Exception: chronic-unchanged  black-grey bowel movements AND is taking iron pills or Pepto-bismol)    Negative: [1] Pregnant > 24 weeks AND [2] hand or face swelling    Negative: Patient sounds very sick or weak to the triager    Protocols used: ABDOMINAL PAIN - UPPER-A-AH      "

## 2019-12-29 NOTE — ED TRIAGE NOTES
"Has had intermittent L sided chest pain since 12/24     Was seen in clinic on 12/26 has stress test scheduled    Today states \"just haven't been feeling right\" pain has been constant all day    Is in no distress    Denies any symptoms aside form pain  "

## 2020-01-07 ENCOUNTER — HOSPITAL ENCOUNTER (OUTPATIENT)
Dept: CARDIOLOGY | Facility: CLINIC | Age: 40
Discharge: HOME | End: 2020-01-07
Attending: FAMILY MEDICINE | Admitting: FAMILY MEDICINE
Payer: COMMERCIAL

## 2020-01-07 DIAGNOSIS — R07.9 CHEST PAIN, UNSPECIFIED TYPE: ICD-10-CM

## 2020-01-07 PROCEDURE — 93016 CV STRESS TEST SUPVJ ONLY: CPT | Performed by: FAMILY MEDICINE

## 2020-01-07 PROCEDURE — 93017 CV STRESS TEST TRACING ONLY: CPT

## 2020-01-07 PROCEDURE — 93018 CV STRESS TEST I&R ONLY: CPT | Performed by: FAMILY MEDICINE

## 2020-10-16 ENCOUNTER — OFFICE VISIT (OUTPATIENT)
Dept: FAMILY MEDICINE | Facility: CLINIC | Age: 40
End: 2020-10-16
Payer: COMMERCIAL

## 2020-10-16 VITALS
RESPIRATION RATE: 16 BRPM | TEMPERATURE: 98 F | DIASTOLIC BLOOD PRESSURE: 66 MMHG | OXYGEN SATURATION: 100 % | HEIGHT: 70 IN | HEART RATE: 76 BPM | BODY MASS INDEX: 27.46 KG/M2 | SYSTOLIC BLOOD PRESSURE: 126 MMHG | WEIGHT: 191.8 LBS

## 2020-10-16 DIAGNOSIS — Z23 NEED FOR PROPHYLACTIC VACCINATION AND INOCULATION AGAINST INFLUENZA: ICD-10-CM

## 2020-10-16 DIAGNOSIS — F41.1 GENERALIZED ANXIETY DISORDER: ICD-10-CM

## 2020-10-16 DIAGNOSIS — F32.0 MILD MAJOR DEPRESSION (H): Primary | ICD-10-CM

## 2020-10-16 PROCEDURE — 99214 OFFICE O/P EST MOD 30 MIN: CPT | Mod: 25 | Performed by: FAMILY MEDICINE

## 2020-10-16 PROCEDURE — 90471 IMMUNIZATION ADMIN: CPT | Performed by: FAMILY MEDICINE

## 2020-10-16 PROCEDURE — 90686 IIV4 VACC NO PRSV 0.5 ML IM: CPT | Performed by: FAMILY MEDICINE

## 2020-10-16 RX ORDER — CITALOPRAM HYDROBROMIDE 10 MG/1
10 TABLET ORAL DAILY
Qty: 30 TABLET | Refills: 1 | Status: SHIPPED | OUTPATIENT
Start: 2020-10-16 | End: 2020-12-04

## 2020-10-16 ASSESSMENT — MIFFLIN-ST. JEOR: SCORE: 1796.25

## 2020-10-16 ASSESSMENT — ANXIETY QUESTIONNAIRES
3. WORRYING TOO MUCH ABOUT DIFFERENT THINGS: SEVERAL DAYS
1. FEELING NERVOUS, ANXIOUS, OR ON EDGE: SEVERAL DAYS
IF YOU CHECKED OFF ANY PROBLEMS ON THIS QUESTIONNAIRE, HOW DIFFICULT HAVE THESE PROBLEMS MADE IT FOR YOU TO DO YOUR WORK, TAKE CARE OF THINGS AT HOME, OR GET ALONG WITH OTHER PEOPLE: SOMEWHAT DIFFICULT
7. FEELING AFRAID AS IF SOMETHING AWFUL MIGHT HAPPEN: NOT AT ALL
2. NOT BEING ABLE TO STOP OR CONTROL WORRYING: SEVERAL DAYS
GAD7 TOTAL SCORE: 6
5. BEING SO RESTLESS THAT IT IS HARD TO SIT STILL: NOT AT ALL
6. BECOMING EASILY ANNOYED OR IRRITABLE: MORE THAN HALF THE DAYS

## 2020-10-16 ASSESSMENT — PATIENT HEALTH QUESTIONNAIRE - PHQ9
SUM OF ALL RESPONSES TO PHQ QUESTIONS 1-9: 7
5. POOR APPETITE OR OVEREATING: SEVERAL DAYS

## 2020-10-16 NOTE — PATIENT INSTRUCTIONS
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 Avoid caffeine and other stimulants. Exercise daily. If counseling is desired, then call our RN at [phone] and I will make the referral.   For medication Celexa at 10 mg daily will be started. Call if any side effects. This will take several days to start working and several weeks for the max effect.   If doing well then recheck in clinic in about 4 weeks.     (F41.1) Generalized anxiety disorder  Comment:   Plan: citalopram (CELEXA) 10 MG tablet        See above.

## 2020-10-16 NOTE — PROGRESS NOTES
"Subjective     Nas Jordon Trejo is a 40 year old male who presents to clinic today for the following health issues:    HPI         Abnormal Mood Symptoms  Onset/Duration: ongoing 10 + years. Was on a medication after he got out of the . Not sure what it was.  Description: Anxiety  Depression (if yes, do PHQ-9): not sure  Anxiety (if yes, do COY-7): YES  Accompanying Signs & Symptoms:  Still participating in activities that you used to enjoy: YES  Fatigue: no  Irritability: YES- sometimes  Difficulty concentrating: no  Changes in appetite: YES- decreased a little bit  Problems with sleep: no  Heart racing/beating fast: YES- kind of not sure  Abnormally elevated, expansive, or irritable mood: no  Persistently increased activity or energy: no  Thoughts of hurting yourself or others: no  History:  Recent stress or major life event: YES- busy at work in the [employer]  Prior depression or anxiety: Yes- Anxiety  Family history of depression or anxiety: no  Alcohol/drug use: YES- alcohol  Difficulty sleeping: no  Precipitating or alleviating factors: None  Therapies tried and outcome: many years ago he was on a med for about one year. He prefers to not use medication.   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     Today the PHQ is 7, and the COY is 6.     Current Outpatient Medications   Medication Instructions     desoximetasone (TOPICORT) 0.25 % cream Apply sparingly to affected area twice daily     ibuprofen (ADVIL/MOTRIN) 600 mg, Oral, EVERY 8 HOURS PRN       Patient Active Problem List   Diagnosis     Eczema, unspecified type     Blood pressure 126/66, pulse 76, temperature 98  F (36.7  C), temperature source Tympanic, resp. rate 16, height 1.778 m (5' 10\"), weight 87 kg (191 lb 12.8 oz), SpO2 100 %.    Exam:  GENERAL APPEARANCE: healthy, alert and no distress  SKIN: no suspicious lesions or rashes  PSYCH: mentation appears normal and affect normal/bright    (F32.0) Mild major " depression (H)  (primary encounter diagnosis)  Comment:   Plan: citalopram (CELEXA) 10 MG tablet        We discussed the non drug therapies. Avoid caffeine and other stimulants. Exercise daily. If counseling is desired, then call our RN at [phone] and I will make the referral.   For medication Celexa at 10 mg daily will be started. Call if any side effects. This will take several days to start working and several weeks for the max effect.   If doing well then recheck in clinic in about 4 weeks.     (F41.1) Generalized anxiety disorder  Comment:   Plan: citalopram (CELEXA) 10 MG tablet        See above.       Gregg Medina MD

## 2020-10-17 ASSESSMENT — ANXIETY QUESTIONNAIRES: GAD7 TOTAL SCORE: 6

## 2020-12-04 ENCOUNTER — OFFICE VISIT (OUTPATIENT)
Dept: FAMILY MEDICINE | Facility: CLINIC | Age: 40
End: 2020-12-04
Payer: COMMERCIAL

## 2020-12-04 VITALS
OXYGEN SATURATION: 97 % | HEART RATE: 70 BPM | RESPIRATION RATE: 16 BRPM | SYSTOLIC BLOOD PRESSURE: 110 MMHG | TEMPERATURE: 97.9 F | HEIGHT: 70 IN | DIASTOLIC BLOOD PRESSURE: 72 MMHG | WEIGHT: 190 LBS | BODY MASS INDEX: 27.2 KG/M2

## 2020-12-04 DIAGNOSIS — L30.9 ECZEMA, UNSPECIFIED TYPE: ICD-10-CM

## 2020-12-04 DIAGNOSIS — F32.0 MILD MAJOR DEPRESSION (H): ICD-10-CM

## 2020-12-04 DIAGNOSIS — F41.1 GENERALIZED ANXIETY DISORDER: ICD-10-CM

## 2020-12-04 PROCEDURE — 99214 OFFICE O/P EST MOD 30 MIN: CPT | Performed by: FAMILY MEDICINE

## 2020-12-04 RX ORDER — CITALOPRAM HYDROBROMIDE 10 MG/1
10 TABLET ORAL DAILY
Qty: 30 TABLET | Refills: 4 | Status: SHIPPED | OUTPATIENT
Start: 2020-12-04 | End: 2021-03-18

## 2020-12-04 RX ORDER — DESOXIMETASONE 2.5 MG/G
CREAM TOPICAL
Qty: 60 G | Refills: 3 | Status: SHIPPED | OUTPATIENT
Start: 2020-12-04

## 2020-12-04 ASSESSMENT — ANXIETY QUESTIONNAIRES
7. FEELING AFRAID AS IF SOMETHING AWFUL MIGHT HAPPEN: NOT AT ALL
1. FEELING NERVOUS, ANXIOUS, OR ON EDGE: SEVERAL DAYS
5. BEING SO RESTLESS THAT IT IS HARD TO SIT STILL: NOT AT ALL
2. NOT BEING ABLE TO STOP OR CONTROL WORRYING: NOT AT ALL
6. BECOMING EASILY ANNOYED OR IRRITABLE: NOT AT ALL
GAD7 TOTAL SCORE: 1
3. WORRYING TOO MUCH ABOUT DIFFERENT THINGS: NOT AT ALL

## 2020-12-04 ASSESSMENT — MIFFLIN-ST. JEOR: SCORE: 1788.08

## 2020-12-04 ASSESSMENT — PATIENT HEALTH QUESTIONNAIRE - PHQ9
5. POOR APPETITE OR OVEREATING: NOT AT ALL
SUM OF ALL RESPONSES TO PHQ QUESTIONS 1-9: 2

## 2020-12-04 NOTE — PROGRESS NOTES
Subjective     Nas Jordon Trejo is a 40 year old male who presents to clinic today for the following health issues:    HPI         Depression and Anxiety Follow-Up    How are you doing with your depression since your last visit? Improved     How are you doing with your anxiety since your last visit?  Improved     Are you having other symptoms that might be associated with depression or anxiety? No    Have you had a significant life event? OTHER: Just has been a hard year.     Do you have any concerns with your use of alcohol or other drugs? No    Social History     Tobacco Use     Smoking status: Former Smoker     Packs/day: 0.50     Quit date: 2018     Years since quittin.9     Smokeless tobacco: Current User     Types: Chew   Substance Use Topics     Alcohol use: Yes     Comment: social     Drug use: No     PHQ 10/16/2020   PHQ-9 Total Score 7   Q9: Thoughts of better off dead/self-harm past 2 weeks Not at all     COY-7 SCORE 10/16/2020   Total Score 6     Last PHQ-9 10/16/2020   1.  Little interest or pleasure in doing things 1   2.  Feeling down, depressed, or hopeless 2   3.  Trouble falling or staying asleep, or sleeping too much 1   4.  Feeling tired or having little energy 1   5.  Poor appetite or overeating 1   6.  Feeling bad about yourself 1   7.  Trouble concentrating 0   8.  Moving slowly or restless 0   Q9: Thoughts of better off dead/self-harm past 2 weeks 0   PHQ-9 Total Score 7   Difficulty at work, home, or with people Somewhat difficult     COY-7  10/16/2020   1. Feeling nervous, anxious, or on edge 1   2. Not being able to stop or control worrying 1   3. Worrying too much about different things 1   4. Trouble relaxing 1   5. Being so restless that it is hard to sit still 0   6. Becoming easily annoyed or irritable 2   7. Feeling afraid, as if something awful might happen 0   COY-7 Total Score 6   If you checked any problems, how difficult have they made it for you to do your  "work, take care of things at home, or get along with other people? Somewhat difficult       Suicide Assessment Five-step Evaluation and Treatment (SAFE-T)      How many servings of fruits and vegetables do you eat daily?  1-2 daily.    On average, how many sweetened beverages do you drink each day (Examples: soda, juice, sweet tea, etc.  Do NOT count diet or artificially sweetened beverages)?   0    How many days per week do you exercise enough to make your heart beat faster? 5-6    How many minutes a day do you exercise enough to make your heart beat faster? 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    How many days per week do you miss taking your medication? 0    Medication Followup of Eczema    Taking Medication as prescribed: Yes, he will use this more in the Winter when his skin is dry.    Side Effects:  None    Medication Helping Symptoms:  yes     FORM:  He has a form for work that he would like to discuss about.      Current Outpatient Medications   Medication Instructions     citalopram (CELEXA) 10 mg, Oral, DAILY     desoximetasone (TOPICORT) 0.25 % cream Apply sparingly to affected area twice daily       Patient Active Problem List   Diagnosis     Eczema, unspecified type       Blood pressure 110/72, pulse 70, temperature 97.9  F (36.6  C), temperature source Tympanic, resp. rate 16, height 1.778 m (5' 10\"), weight 86.2 kg (190 lb), SpO2 97 %.    Exam:  GENERAL APPEARANCE: healthy, alert and no distress  SKIN: no suspicious lesions or rashes  PSYCH: mentation appears normal and affect normal/bright      (F32.0) Mild major depression (H)  Comment:   Plan: citalopram (CELEXA) 10 MG tablet        We discussed the issues and you are doing very well!!  Stay on the same dose of the med, and use the non drug therapies.   The length of therapy is 6 months minimum, and refills are done til May, 2021.   If doing well then recheck at that time. Call sooner if any worse.     (F41.1) Generalized anxiety disorder  Comment:   Plan: " citalopram (CELEXA) 10 MG tablet        See above. Avoid stimulants.     (L30.9) Eczema, unspecified type  Comment:   Plan: desoximetasone (TOPICORT) 0.25 % external cream        Use as needed in a thin film twice daily for the shortest time.   Refills are done.       Gregg Medina MD

## 2020-12-04 NOTE — PATIENT INSTRUCTIONS
Thank you for choosing [facility]  You may be receiving an email and/or telephone survey request from [facility] [employer] regarding your visit today.  Please take a few minutes to respond to the survey to let us know how we are doing.      If you have questions or concerns, please contact us via [employer] or you can contact your care team at [phone].    Our Clinic hours are:  Monday 6:40 am  to 7:00 pm  Tuesday -Friday 6:40 am to 5:00 pm    The [place] outpatient lab hours are:  Monday - Friday 6:10 am to 4:45 pm  Saturdays 7:00 am to 11:00 am  Appointments are required, call [phone]    If you have clinical questions after hours or would like to schedule an appointment,  call the clinic at [phone].      (F32.0) Mild major depression (H)  Comment:   Plan: citalopram (CELEXA) 10 MG tablet        We discussed the issues and you are doing very well!!  Stay on the same dose of the med, and use the non drug therapies.   The length of therapy is 6 months minimum, and refills are done til May, 2021.   If doing well then recheck at that time. Call sooner if any worse.     (F41.1) Generalized anxiety disorder  Comment:   Plan: citalopram (CELEXA) 10 MG tablet        See above. Avoid stimulants.     (L30.9) Eczema, unspecified type  Comment:   Plan: desoximetasone (TOPICORT) 0.25 % external cream        Use as needed in a thin film twice daily for the shortest time.   Refills are done.

## 2020-12-05 ASSESSMENT — ANXIETY QUESTIONNAIRES: GAD7 TOTAL SCORE: 1

## 2021-01-14 ENCOUNTER — HEALTH MAINTENANCE LETTER (OUTPATIENT)
Age: 41
End: 2021-01-14

## 2021-03-18 ENCOUNTER — VIRTUAL VISIT (OUTPATIENT)
Dept: FAMILY MEDICINE | Facility: CLINIC | Age: 41
End: 2021-03-18
Payer: COMMERCIAL

## 2021-03-18 ENCOUNTER — TELEPHONE (OUTPATIENT)
Dept: FAMILY MEDICINE | Facility: CLINIC | Age: 41
End: 2021-03-18

## 2021-03-18 DIAGNOSIS — F41.1 GENERALIZED ANXIETY DISORDER: ICD-10-CM

## 2021-03-18 DIAGNOSIS — F32.0 MILD MAJOR DEPRESSION (H): ICD-10-CM

## 2021-03-18 PROCEDURE — 99214 OFFICE O/P EST MOD 30 MIN: CPT | Mod: TEL | Performed by: FAMILY MEDICINE

## 2021-03-18 PROCEDURE — 96127 BRIEF EMOTIONAL/BEHAV ASSMT: CPT | Mod: 95 | Performed by: FAMILY MEDICINE

## 2021-03-18 RX ORDER — CITALOPRAM HYDROBROMIDE 20 MG/1
20 TABLET ORAL DAILY
Qty: 30 TABLET | Refills: 1 | Status: SHIPPED | OUTPATIENT
Start: 2021-03-18 | End: 2021-05-26

## 2021-03-18 ASSESSMENT — ANXIETY QUESTIONNAIRES
3. WORRYING TOO MUCH ABOUT DIFFERENT THINGS: SEVERAL DAYS
5. BEING SO RESTLESS THAT IT IS HARD TO SIT STILL: NOT AT ALL
6. BECOMING EASILY ANNOYED OR IRRITABLE: NEARLY EVERY DAY
1. FEELING NERVOUS, ANXIOUS, OR ON EDGE: SEVERAL DAYS
7. FEELING AFRAID AS IF SOMETHING AWFUL MIGHT HAPPEN: NOT AT ALL
2. NOT BEING ABLE TO STOP OR CONTROL WORRYING: NOT AT ALL
GAD7 TOTAL SCORE: 6
IF YOU CHECKED OFF ANY PROBLEMS ON THIS QUESTIONNAIRE, HOW DIFFICULT HAVE THESE PROBLEMS MADE IT FOR YOU TO DO YOUR WORK, TAKE CARE OF THINGS AT HOME, OR GET ALONG WITH OTHER PEOPLE: SOMEWHAT DIFFICULT

## 2021-03-18 ASSESSMENT — PATIENT HEALTH QUESTIONNAIRE - PHQ9
SUM OF ALL RESPONSES TO PHQ QUESTIONS 1-9: 8
5. POOR APPETITE OR OVEREATING: SEVERAL DAYS

## 2021-03-18 NOTE — PROGRESS NOTES
Nas is a 40 year old who is being evaluated via a billable telephone visit.      What phone number would you like to be contacted at? [phone]  How would you like to obtain your AVS? Michael Lovell is a 40 year old who presents for the following health issues     HPI     Depression Followup    How are you doing with your depression since your last visit? Worsened seemed ok for awhile but now feels like meds aren't working as good being more irritable    Are you having other symptoms that might be associated with depression? No    Have you had a significant life event?  No     Are you feeling anxious or having panic attacks?   Yes:  anxious once in awhile    Do you have any concerns with your use of alcohol or other drugs? Yes:  is trying to cut out alcohol out completely but does not have an issue with excessive use    Social History     Tobacco Use     Smoking status: Former Smoker     Packs/day: 0.50     Quit date: 1/1/2018     Years since quitting: 3.2     Smokeless tobacco: Current User     Types: Chew   Substance Use Topics     Alcohol use: Yes     Comment: social     Drug use: No     PHQ 10/16/2020 12/4/2020 3/18/2021   PHQ-9 Total Score 7 2 8   Q9: Thoughts of better off dead/self-harm past 2 weeks Not at all Not at all Not at all     COY-7 SCORE 10/16/2020 12/4/2020 3/18/2021   Total Score 6 1 6         Suicide Assessment Five-step Evaluation and Treatment (SAFE-T)      How many days per week do you miss taking your medication? 0    No side effects of medication.      Current Outpatient Medications   Medication Instructions     citalopram (CELEXA) 10 mg, Oral, DAILY     desoximetasone (TOPICORT) 0.25 % external cream Apply sparingly to affected area twice daily       Patient Active Problem List   Diagnosis     Eczema, unspecified type       (F32.0) Mild major depression (H)  Comment:   Plan: citalopram (CELEXA) 20 MG tablet        We discussed the issues and options and will increase  the dose to 20 mg daily. Call if any side effects.   Use the non drug therapies. If doing well then recheck in about 4 weeks.     (F41.1) Generalized anxiety disorder  Comment:   Plan: citalopram (CELEXA) 20 MG tablet        See above. Avoid stimulants.       Gregg Medina MD      Phone call duration: 12 minutes

## 2021-03-18 NOTE — TELEPHONE ENCOUNTER
Reason for call:    Symptom or request:     Patient called stating that he would like to increase his celexa to 20 mg. He reports that the med is no longer working like it first did when he started, he reports his wife has made comment about his short fuse. 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 Explained to him an appt may be needed; please confirm this is next step.  Needs appts.    Last seen 12/04/2020--    (F32.0) Mild major depression (H)  Comment:   Plan: citalopram (CELEXA) 10 MG tablet        We discussed the issues and you are doing very well!!  Stay on the same dose of the med, and use the non drug therapies.   The length of therapy is 6 months minimum, and refills are done til May, 2021.   If doing well then recheck at that time. Call sooner if any worse.     Pharmacy: [facility]      Best Time:  any    Can we leave a detailed message on this number?  YES     Kamala ARCHULETA  Station

## 2021-03-18 NOTE — PATIENT INSTRUCTIONS
(F32.0) Mild major depression (H)  Comment:   Plan: citalopram (CELEXA) 20 MG tablet        We discussed the issues and options and will increase the dose to 20 mg daily. Call if any side effects.   Use the non drug therapies. If doing well then recheck in about 4 weeks.     (F41.1) Generalized anxiety disorder  Comment:   Plan: citalopram (CELEXA) 20 MG tablet        See above. Avoid stimulants.

## 2021-03-19 ASSESSMENT — ANXIETY QUESTIONNAIRES: GAD7 TOTAL SCORE: 6

## 2021-05-24 DIAGNOSIS — F41.1 GENERALIZED ANXIETY DISORDER: ICD-10-CM

## 2021-05-24 DIAGNOSIS — F32.0 MILD MAJOR DEPRESSION (H): ICD-10-CM

## 2021-05-25 NOTE — TELEPHONE ENCOUNTER
"Requested Prescriptions   Pending Prescriptions Disp Refills     citalopram (CELEXA) 20 MG tablet [Pharmacy Med Name: citalopram 20 mg tablet] 30 tablet 1     Sig: Take 1 tablet (20 mg) by mouth daily       SSRIs Protocol Failed - 5/24/2021  8:00 AM        Failed - PHQ-9 score less than 5 in past 6 months     Please review last PHQ-9 score.           Passed - Medication is active on med list        Passed - Patient is age 18 or older        Passed - Recent (6 mo) or future (30 days) visit within the authorizing provider's specialty     Patient had office visit in the last 6 months or has a visit in the next 30 days with authorizing provider or within the authorizing provider's specialty.  See \"Patient Info\" tab in inbasket, or \"Choose Columns\" in Meds & Orders section of the refill encounter.                 "

## 2021-05-26 RX ORDER — CITALOPRAM HYDROBROMIDE 20 MG/1
20 TABLET ORAL DAILY
Qty: 30 TABLET | Refills: 1 | Status: SHIPPED | OUTPATIENT
Start: 2021-05-26 | End: 2021-07-19

## 2021-07-19 ENCOUNTER — OFFICE VISIT (OUTPATIENT)
Dept: FAMILY MEDICINE | Facility: CLINIC | Age: 41
End: 2021-07-19
Payer: COMMERCIAL

## 2021-07-19 VITALS
HEIGHT: 69 IN | OXYGEN SATURATION: 97 % | BODY MASS INDEX: 27.64 KG/M2 | WEIGHT: 186.6 LBS | HEART RATE: 89 BPM | SYSTOLIC BLOOD PRESSURE: 122 MMHG | DIASTOLIC BLOOD PRESSURE: 86 MMHG | TEMPERATURE: 98.5 F | RESPIRATION RATE: 14 BRPM

## 2021-07-19 DIAGNOSIS — F32.0 MILD MAJOR DEPRESSION (H): ICD-10-CM

## 2021-07-19 DIAGNOSIS — Z11.59 NEED FOR HEPATITIS C SCREENING TEST: ICD-10-CM

## 2021-07-19 DIAGNOSIS — Z13.220 LIPID SCREENING: ICD-10-CM

## 2021-07-19 DIAGNOSIS — F41.1 GENERALIZED ANXIETY DISORDER: ICD-10-CM

## 2021-07-19 DIAGNOSIS — Z13.1 SCREENING FOR DIABETES MELLITUS: ICD-10-CM

## 2021-07-19 DIAGNOSIS — L81.8 TATTOOS: ICD-10-CM

## 2021-07-19 DIAGNOSIS — Z11.4 SCREENING FOR HUMAN IMMUNODEFICIENCY VIRUS: ICD-10-CM

## 2021-07-19 DIAGNOSIS — H61.21 IMPACTED CERUMEN OF RIGHT EAR: ICD-10-CM

## 2021-07-19 DIAGNOSIS — Z00.00 ROUTINE GENERAL MEDICAL EXAMINATION AT A HEALTH CARE FACILITY: Primary | ICD-10-CM

## 2021-07-19 DIAGNOSIS — H57.9 EYE SYMPTOMS: ICD-10-CM

## 2021-07-19 PROCEDURE — 99214 OFFICE O/P EST MOD 30 MIN: CPT | Mod: 25 | Performed by: FAMILY MEDICINE

## 2021-07-19 PROCEDURE — 99395 PREV VISIT EST AGE 18-39: CPT | Mod: 25 | Performed by: FAMILY MEDICINE

## 2021-07-19 PROCEDURE — 69209 REMOVE IMPACTED EAR WAX UNI: CPT | Mod: RT | Performed by: FAMILY MEDICINE

## 2021-07-19 RX ORDER — CITALOPRAM HYDROBROMIDE 40 MG/1
40 TABLET ORAL DAILY
Qty: 90 TABLET | Refills: 0 | Status: SHIPPED | OUTPATIENT
Start: 2021-07-19 | End: 2021-10-28

## 2021-07-19 ASSESSMENT — MIFFLIN-ST. JEOR: SCORE: 1751.79

## 2021-07-19 NOTE — PROGRESS NOTES
3  SUBJECTIVE:   CC: Nas Jordon Trejo is an 41 year old male who presents for preventive health visit.       Patient has been advised of split billing requirements and indicates understanding: Yes  Healthy Habits:    Do you get at least three servings of calcium containing foods daily (dairy, green leafy vegetables, etc.)? yes    Amount of exercise or daily activities, outside of work: 2-3 day(s) per week, walking, has started to run again    Problems taking medications regularly No    Medication side effects: No    Have you had an eye exam in the past two years? yes    Do you see a dentist twice per year? yes    Do you have sleep apnea, excessive snoring or daytime drowsiness? trouble falling asleep, can't shut mind off at night    Pt has been having issues with eyes burning intermittently - can be from none per week to few days a week. Can last various duration. Been able to relieve the last episode by [place]. No specific triggers or incident.  Wondering if he should see eye doctor. Tried visine - tends to relive.    Today's PHQ-2 Score:   PHQ-2 ( 1999 Pfizer) 7/19/2021 12/27/2019   Q1: Little interest or pleasure in doing things 0 0   Q2: Feeling down, depressed or hopeless 0 0   PHQ-2 Score 0 0       Abuse: Current or Past(Physical, Sexual or Emotional)- No  Do you feel safe in your environment? Yes    Have you ever done Advance Care Planning? (For example, a Health Directive, POLST, or a discussion with a medical provider or your loved ones about your wishes): No, advance care planning information given to patient to review.  Patient declined advance care planning discussion at this time.    Social History     Tobacco Use     Smoking status: Former Smoker     Packs/day: 0.50     Years: 20.00     Pack years: 10.00     Quit date: 1/1/2018     Years since quitting: 3.5     Smokeless tobacco: Current User     Types: Chew   Substance Use Topics     Alcohol use: Yes     Comment: social     If  you drink alcohol do you typically have >3 drinks per day or >7 drinks per week? No                      Last PSA: No results found for: PSA    Reviewed orders with patient. Reviewed health maintenance and updated orders accordingly - Yes  Patient Active Problem List   Diagnosis     Eczema, unspecified type     Past Surgical History:   Procedure Laterality Date     ENT SURGERY       HERNIA REPAIR  01/01/2006     TONSILLECTOMY         Social History     Tobacco Use     Smoking status: Former Smoker     Packs/day: 0.50     Years: 20.00     Pack years: 10.00     Quit date: 1/1/2018     Years since quitting: 3.5     Smokeless tobacco: Current User     Types: Chew   Substance Use Topics     Alcohol use: Yes     Comment: social     Family History   Problem Relation Age of Onset     Diabetes Father         type 2     Thyroid Disease Sister          Current Outpatient Medications   Medication Sig Dispense Refill     citalopram (CELEXA) 40 MG tablet Take 1 tablet (40 mg) by mouth daily 90 tablet 0     desoximetasone (TOPICORT) 0.25 % external cream Apply sparingly to affected area twice daily (Patient not taking: Reported on 7/19/2021) 60 g 3     No Known Allergies    Reviewed and updated as needed this visit by clinical staff  Tobacco  Allergies  Meds   Med Hx  Surg Hx  Fam Hx  Soc Hx        Reviewed and updated as needed this visit by Provider                History reviewed. No pertinent past medical history.   Past Surgical History:   Procedure Laterality Date     ENT SURGERY       HERNIA REPAIR  01/01/2006     TONSILLECTOMY         ROS:  CONSTITUTIONAL: NEGATIVE for fever, chills, change in weight  INTEGUMENTARY/SKIN: NEGATIVE for worrisome rashes, moles or lesions  EYES: NEGATIVE for vision changes or irritation  ENT: NEGATIVE for ear, mouth and throat problems  RESP: NEGATIVE for significant cough or SOB  CV: NEGATIVE for chest pain, palpitations or peripheral edema  GI: NEGATIVE for nausea, abdominal pain,  "heartburn, or change in bowel habits   male: negative for dysuria, hematuria, decreased urinary stream, erectile dysfunction, urethral discharge  MUSCULOSKELETAL: NEGATIVE for significant arthralgias or myalgia  NEURO: NEGATIVE for weakness, dizziness or paresthesias  ENDOCRINE: NEGATIVE for temperature intolerance, skin/hair changes  HEME/ALLERGY/IMMUNE: NEGATIVE for bleeding problems  PSYCHIATRIC: NEGATIVE for changes in mood or affect    OBJECTIVE:   /86   Pulse 89   Temp 98.5  F (36.9  C) (Tympanic)   Resp 14   Ht 1.753 m (5' 9\")   Wt 84.6 kg (186 lb 9.6 oz)   SpO2 97%   BMI 27.56 kg/m    EXAM:  GENERAL APPEARANCE: overweight, alert and no distress  EYES: pink conj, no icterus, PERRL, EOMI  HENT: ear canal with impacted cerumen non right and clear on left; tympanic membrane intact and non-injected on left but not visible on right due to cerumen; nose and mouth without ulcers or lesions, oropharynx clear and oral mucous membranes moist  NECK: no adenopathy, no asymmetry, masses, or scars and thyroid normal to palpation  RESP: lungs clear to auscultation - no rales, rhonchi or wheezes  CV: regular rates and rhythm, normal S1 S2, no S3 or S4, no murmur, click or rub, no peripheral edema and peripheral pulses strong  ABDOMEN: soft, nontender, no hepatosplenomegaly, no masses and bowel sounds normal  RECTAL: deferreed  MS: no musculoskeletal defects are noted and gait is age appropriate without ataxia  SKIN: no suspicious lesions or rashes  NEURO: Normal strength and tone, sensory exam grossly normal, mentation intact and speech normal  PSYCH: well-kempt, linear thought process, normal mood, appropriate affect, no suicidality/aggression/hallucination  Diagnostic Test Results:  none     ASSESSMENT/PLAN:   Nas was seen today for establish care and physical.    Diagnoses and all orders for this visit:    Routine general medical examination at a health care facility    Mild major depression (H)  -     " citalopram (CELEXA) 40 MG tablet; Take 1 tablet (40 mg) by mouth daily  -     OFFICE/OUTPT VISIT,EST,LEVL IV  -     MENTAL HEALTH REFERRAL  - Adult; Outpatient Treatment; Individual/Couples/Family/Group Therapy/Health Psychology; [facility] [place]: [facility] [phone]; We will contact you to schedule the appointment or please call with any questions; Future  Not actively showing depressed mood on exam, however, his nighttime symptoms may be due to this.  CBT recommended in addition to increasing dose of med.  Patient concurred.  Advised suicidal precautions.    Generalized anxiety disorder  -     citalopram (CELEXA) 40 MG tablet; Take 1 tablet (40 mg) by mouth daily  -     OFFICE/OUTPT VISIT,EST,LEVL IV  -     MENTAL HEALTH REFERRAL  - Adult; Outpatient Treatment; Individual/Couples/Family/Group Therapy/Health Psychology; [facility] [place]: [facility] [phone]; We will contact you to schedule the appointment or please call with any questions; Future  Likely sstill suboptimal control. Discussed value of CBT for his running thoughts before bed. He concurred with referral.  Increase citalopram dose.  Return precautions discussed and given to patient.     Eye symptoms  DDx: dry eye syndrome, meibomian gland inflammation, irritant conjunctivitis. No sign of infective conjunctivitis.  Patient was advised to seek full eye exam with his ophthalmologist.  Return precautions discussed and given to patient.   Try artificial tears?     Impacted cerumen of right ear  -     UT REMOVAL IMPACTED CERUMEN IRRIGATION/LVG UNILAT  Discussed with patient findings on exam.  Advised on treatment options; she concurred with aural irrigation today.  Patient was advised of procedure, expected outcome and possible risks.  CMA performed aural irrigation to right ears using warm tap water with complete evacuation of cerumen from right ear  Tympanic membrane visualized intact by CMA on right  ear.  Advised routine ear  "care.  Return precautions discussed and given to patient.    Lipid screening  -     Lipid panel reflex to direct LDL Fasting; Future    Screening for diabetes mellitus  -     Glucose; Future    Screening for human immunodeficiency virus  -     HIV Antigen Antibody Combo; Future  Offered screening based on current recommendations. Patient concurred to screen.    Need for hepatitis C screening test  -     Hepatitis C Screen Reflex to HCV RNA Quant and Genotype; Future  Offered screening based on current recommendations. Patient concurred to screen.    Tattoos      Other orders  -     REVIEW OF HEALTH MAINTENANCE PROTOCOL ORDERS        Patient has been advised of split billing requirements and indicates understanding: Yes  COUNSELING:  Reviewed preventive health counseling, as reflected in patient instructions    Estimated body mass index is 27.56 kg/m  as calculated from the following:    Height as of this encounter: 1.753 m (5' 9\").    Weight as of this encounter: 84.6 kg (186 lb 9.6 oz).    Weight management plan: Discussed healthy diet and exercise guidelines    He reports that he quit smoking about 3 years ago. He has a 10.00 pack-year smoking history. His smokeless tobacco use includes chew.      Counseling Resources:  ATP IV Guidelines  Pooled Cohorts Equation Calculator  FRAX Risk Assessment  ICSI Preventive Guidelines  Dietary Guidelines for Americans, 2010  USDA's MyPlate  ASA Prophylaxis  Lung CA Screening    Jax Bazan MD  [facility]  "

## 2021-07-19 NOTE — PATIENT INSTRUCTIONS
Schedule lab appointment. Be fasting more than 8 hours before blood draw    Increase citalopram to 40 mg daily.  You will be contacted in 1-2 business days to get a schedule for the behavior therapy specialist.  Virtual visit with Dr. Bazan for follow up on anxiety in 2 months; sooner if with worsening.      Schedule full eey exam with your eye doctor due to the white spots under your eyelids and the intermittent burning sensation you have been experiencing.    Preventative Care Visits include: Yearly physicals, Well-child visits, Welcome to Medicare visits, & Medicare yearly wellness exams.    The purpose of these visits is to discuss your medical history and prevent health problems before you are sick.  You may need to pay a copay, coinsurance or deductible if your visit today includes testing or treating for a new or existing condition.    Additional charges may be incurred for today's visit. If you have questions about what your insurance plan covers, please contact your Insurance Company's member service department.  If you have questions specific to a bill you have already received from [facility], please contact the [email or web address] Billing Office at [phone].     Preventive Health Recommendations  Male Ages 26 - 39    Yearly exam:             See your health care provider every year in order to  o   Review health changes.   o   Discuss preventive care.    o   Review your medicines if your doctor has prescribed any.    You should be tested each year for STDs (sexually transmitted diseases), if you re at risk.     After age 35, talk to your provider about cholesterol testing. If you are at risk for heart disease, have your cholesterol tested at least every 5 years.     If you are at risk for diabetes, you should have a diabetes test (fasting glucose).  Shots: Get a flu shot each year. Get a tetanus shot every 10 years.     Nutrition:    Eat at least 5 servings of fruits and vegetables daily.     Eat whole-grain  bread, whole-wheat pasta and brown rice instead of white grains and rice.     Get adequate Calcium and Vitamin D.     Lifestyle    Exercise for at least 150 minutes a week (30 minutes a day, 5 days a week). This will help you control your weight and prevent disease.     Limit alcohol to one drink per day.     No smoking.     Wear sunscreen to prevent skin cancer.     See your dentist every six months for an exam and cleaning.     Patient Education     Anxiety Reaction  Anxiety is the feeling we all get when we think something bad might happen. It is a normal response to stress and normally causes only a mild reaction. When anxiety becomes more severe, it can interfere with daily life. In some cases, you may not even be aware of what you re anxious about. There may also be a genetic link. Or it may be a learned behavior in the home.   Both psychological and physical triggers cause stress reaction. It's often a response to fear or emotional stress, real or imagined. This stress may come from home, family, work, or social relationships.   During an anxiety reaction, you may feel:    Helpless    Nervous    Depressed    Grouchy  Your body may show signs of anxiety in many ways. You may experience:    Dry mouth    Shakiness    Dizziness    Weakness    Trouble breathing    Breathing fast (hyperventilating)    Chest pressure    Sweating    Headache    Nausea    Diarrhea    Tiredness    Inability to sleep    Sexual problems  Home care    Try to find the sources of stress in your life. They may not be obvious. These may include:  ? Daily hassles of life (such as traffic jams, missed appointments, or car troubles)  ? Major life changes, both good (new baby or job promotion) and bad (loss of job or loss of loved one)  ? Overload (feeling that you have too many responsibilities and can't take care of all of them at once)  ? Feeling helpless or feeling that your problems can't be solved    Notice how your body reacts to stress.  Learn to listen to your body signals. This will help you take action before the stress becomes severe.    When you can, do something about the source of your stress. (Avoid hassles, limit the amount of change that happens in your life at one time, and take a break when you feel overloaded).    Unfortunately, many stressful situations can't be avoided. It is necessary to learn how to better manage stress. There are many proven methods that will reduce your anxiety. These include simple things such as exercise, good nutrition, and adequate rest. Also, there are certain techniques that are helpful:  ? Relaxation  ? Breathing exercises  ? Visualization  ? Biofeedback  ? Meditation  For more information about this, talk with your healthcare provider. Or check online or at your local library or bookstore. You'll find many books and audiobooks on this subject.   Follow-up care  If you feel your anxiety is not responding to self-help measures, call your healthcare provider or make an appointment with a counselor. You may need short-term psychological counseling or medicine to help you manage stress.   Call 911  Call 911 if any of these happen:     Trouble breathing    Confusion    Drowsiness or trouble waking up    Fainting or loss of consciousness    Rapid heart rate    Seizure    New chest pain that becomes more severe, lasts longer, or spreads into your shoulder, arm, neck, jaw, or back  When to get medical advice  Call your healthcare provider right away if any of these happen:    Your symptoms get worse    Severe headache not eased by rest and mild pain reliever  Jennifer last reviewed this educational content on 4/1/2020 2000-2021 [employer]. All rights reserved. This information is not intended as a substitute for professional medical care. Always follow your healthcare professional's instructions.

## 2021-08-13 ENCOUNTER — E-VISIT (OUTPATIENT)
Dept: URGENT CARE | Facility: CLINIC | Age: 41
End: 2021-08-13
Payer: COMMERCIAL

## 2021-08-13 ENCOUNTER — MYC MEDICAL ADVICE (OUTPATIENT)
Dept: FAMILY MEDICINE | Facility: CLINIC | Age: 41
End: 2021-08-13

## 2021-08-13 DIAGNOSIS — G47.00 INSOMNIA, UNSPECIFIED TYPE: Primary | ICD-10-CM

## 2021-08-13 DIAGNOSIS — Z20.822 SUSPECTED COVID-19 VIRUS INFECTION: Primary | ICD-10-CM

## 2021-08-13 PROCEDURE — 99421 OL DIG E/M SVC 5-10 MIN: CPT | Performed by: INTERNAL MEDICINE

## 2021-08-13 RX ORDER — HYDROXYZINE HYDROCHLORIDE 25 MG/1
25-50 TABLET, FILM COATED ORAL
Qty: 30 TABLET | Refills: 0 | Status: SHIPPED | OUTPATIENT
Start: 2021-08-13

## 2021-08-13 NOTE — PATIENT INSTRUCTIONS
Dear Nas Trejo,    Your symptoms show that you may have coronavirus (COVID-19). This illness can cause fever, cough and trouble breathing. Many people get a mild case and get better on their own. Some people can get very sick.    Will I be tested for COVID-19?  We would like to test you for Covid-19 virus. I have placed orders for this test.     To schedule: go to your [employer] home page and scroll down to the section that says  You have an appointment that needs to be scheduled  and click the large green button that says  Schedule Now  and follow the steps to find the next available openings.    If you are unable to complete these [employer] scheduling steps, please call [phone] to schedule your testing.     Return to work/school/ guidance:  Please let your workplace manager and staffing office know when your quarantine ends     We can t give you an exact date as it depends on the above. You can calculate this on your own or work with your manager/staffing office to calculate this. (For example if you were exposed on 10/4, you would have to quarantine for 14 full days. That would be through 10/18. You could return on 10/19.)      If you receive a positive COVID-19 test result, follow the guidance of the those who are giving you the results. Usually the return to work is 10 (or in some cases 20 days from symptom onset.) If you work at [facility], you must also be cleared by Employee Occupational Health and Safety to return to work.        If you receive a negative COVID-19 test result and did not have a high risk exposure to someone with a known positive COVID-19 test, you can return to work once you're free of fever for 24 hours without fever-reducing medication and your symptoms are improving or resolved.      If you receive a negative COVID-19 test and If you had a high risk exposure to someone who has tested positive for COVID-19 then you can return to work 14 days after your  last contact with the positive individual    Note: If you have ongoing exposure to the covid positive person, this quarantine period may be more than 14 days. (For example, if you are continued to be exposed to your child who tested positive and cannot isolate from them, then the quarantine of 7-14 days can't start until your child is no longer contagious. This is typically 10 days from onset of the child's symptoms. So the total duration may be 17-24 days in this case.)    Sign up for [employer].   We know it's scary to hear that you might have COVID-19. We want to track your symptoms to make sure you're okay over the next 2 weeks. Please look for an email from [employer]--this is a free, online program that we'll use to keep in touch. To sign up, follow the link in the email you will receive. Learn more at [email or web address]    How can I take care of myself?    Get lots of rest. Drink extra fluids (unless a doctor has told you not to)    Take Tylenol (acetaminophen) or ibuprofen for fever or pain. If you have liver or kidney problems, ask your family doctor if it's okay to take Tylenol o ibuprofen    If you have other health problems (like cancer, heart failure, an organ transplant or severe kidney disease): Call your specialty clinic if you don't feel better in the next 2 days.    Know when to call 911. Emergency warning signs include:  o Trouble breathing or shortness of breath  o Pain or pressure in the chest that doesn't go away  o Feeling confused like you haven't felt before, or not being able to wake up  o Bluish-colored lips or face    Where can I get more information?  [facility] - About COVID-19:   [email or web address]    CDC - What to Do If You're Sick:   www.cdc.gov/coronavirus/2019-ncov/about/steps-when-sick.html

## 2021-10-24 ENCOUNTER — HEALTH MAINTENANCE LETTER (OUTPATIENT)
Age: 41
End: 2021-10-24

## 2021-10-28 DIAGNOSIS — F41.1 GENERALIZED ANXIETY DISORDER: ICD-10-CM

## 2021-10-28 DIAGNOSIS — F32.0 MILD MAJOR DEPRESSION (H): ICD-10-CM

## 2021-10-28 NOTE — TELEPHONE ENCOUNTER
"Requested Prescriptions   Pending Prescriptions Disp Refills     citalopram (CELEXA) 40 MG tablet [Pharmacy Med Name: citalopram 40 mg tablet] 30 tablet 2     Sig: Take 1 tablet (40 mg) by mouth daily       SSRIs Protocol Failed - 10/28/2021  8:00 AM        Failed - PHQ-9 score less than 5 in past 6 months     Please review last PHQ-9 score.           Passed - Medication is active on med list        Passed - Patient is age 18 or older        Passed - Recent (6 mo) or future (30 days) visit within the authorizing provider's specialty     Patient had office visit in the last 6 months or has a visit in the next 30 days with authorizing provider or within the authorizing provider's specialty.  See \"Patient Info\" tab in inbasket, or \"Choose Columns\" in Meds & Orders section of the refill encounter.                 "

## 2021-10-28 NOTE — TELEPHONE ENCOUNTER
Please call patient and repeat PHQ9 and GAD7.  If not at goal, schedule follow up (virtual okay).  Based on last Rx, patient is good with Rx until 11/6/2021.

## 2021-11-01 NOTE — TELEPHONE ENCOUNTER
Patient's Rx is good until 11/6/2021. Please call to repeat PHQ9 and GAD7 before I send the refill.

## 2021-11-01 NOTE — TELEPHONE ENCOUNTER
Called pt. Left message. Need phq-9 and mitchell-7 assessments done before md will sent refill. Make appt if does not meet goal    Dany Tomlinson RN

## 2021-11-08 RX ORDER — CITALOPRAM HYDROBROMIDE 40 MG/1
40 TABLET ORAL DAILY
Qty: 30 TABLET | Refills: 0 | Status: SHIPPED | OUTPATIENT
Start: 2021-11-08 | End: 2021-12-02

## 2021-12-02 ENCOUNTER — VIRTUAL VISIT (OUTPATIENT)
Dept: FAMILY MEDICINE | Facility: CLINIC | Age: 41
End: 2021-12-02
Payer: COMMERCIAL

## 2021-12-02 DIAGNOSIS — F32.0 MILD MAJOR DEPRESSION (H): Primary | ICD-10-CM

## 2021-12-02 DIAGNOSIS — F41.1 GENERALIZED ANXIETY DISORDER: ICD-10-CM

## 2021-12-02 DIAGNOSIS — J06.9 VIRAL URI WITH COUGH: ICD-10-CM

## 2021-12-02 PROCEDURE — 99214 OFFICE O/P EST MOD 30 MIN: CPT | Mod: 95 | Performed by: FAMILY MEDICINE

## 2021-12-02 RX ORDER — CITALOPRAM HYDROBROMIDE 20 MG/1
20 TABLET ORAL DAILY
Qty: 90 TABLET | Refills: 0 | Status: SHIPPED | OUTPATIENT
Start: 2021-12-02 | End: 2022-05-27

## 2021-12-02 RX ORDER — GUAIFENESIN 200 MG/1
200 TABLET ORAL EVERY 4 HOURS PRN
Start: 2021-12-02

## 2021-12-02 RX ORDER — CITALOPRAM HYDROBROMIDE 20 MG/1
40 TABLET ORAL DAILY
Qty: 90 TABLET | Refills: 0 | Status: SHIPPED | OUTPATIENT
Start: 2021-12-02 | End: 2021-12-02

## 2021-12-02 ASSESSMENT — ANXIETY QUESTIONNAIRES
1. FEELING NERVOUS, ANXIOUS, OR ON EDGE: NOT AT ALL
3. WORRYING TOO MUCH ABOUT DIFFERENT THINGS: NOT AT ALL
GAD7 TOTAL SCORE: 0
IF YOU CHECKED OFF ANY PROBLEMS ON THIS QUESTIONNAIRE, HOW DIFFICULT HAVE THESE PROBLEMS MADE IT FOR YOU TO DO YOUR WORK, TAKE CARE OF THINGS AT HOME, OR GET ALONG WITH OTHER PEOPLE: NOT DIFFICULT AT ALL
5. BEING SO RESTLESS THAT IT IS HARD TO SIT STILL: NOT AT ALL
6. BECOMING EASILY ANNOYED OR IRRITABLE: NOT AT ALL
2. NOT BEING ABLE TO STOP OR CONTROL WORRYING: NOT AT ALL
7. FEELING AFRAID AS IF SOMETHING AWFUL MIGHT HAPPEN: NOT AT ALL

## 2021-12-02 ASSESSMENT — PATIENT HEALTH QUESTIONNAIRE - PHQ9: 5. POOR APPETITE OR OVEREATING: NOT AT ALL

## 2021-12-02 NOTE — PATIENT INSTRUCTIONS
You preferred to reduce citalopram to 20 mg daily.  Continue to avoid alcohol as it seem to have contributed to your symptoms before.  Keep active and live a healthy lifestyle.  Schedule virtual visit in March 2022 as a follow up.    Take guaifenesin 200 mg tablet 1-2 tablets every 6 hours as needed for chest congestion.  If not improved after 7-10 days, schedule in clinic visit for a more thorough evaluation.    Patient Education     Anxiety Reaction  Anxiety is the feeling we all get when we think something bad might happen. It is a normal response to stress and normally causes only a mild reaction. When anxiety becomes more severe, it can interfere with daily life. In some cases, you may not even be aware of what you re anxious about. There may also be a genetic link. Or it may be a learned behavior in the home.   Both psychological and physical triggers cause stress reaction. It's often a response to fear or emotional stress, real or imagined. This stress may come from home, family, work, or social relationships.   During an anxiety reaction, you may feel:    Helpless    Nervous    Depressed    Grouchy  Your body may show signs of anxiety in many ways. You may experience:    Dry mouth    Shakiness    Dizziness    Weakness    Trouble breathing    Breathing fast (hyperventilating)    Chest pressure    Sweating    Headache    Nausea    Diarrhea    Tiredness    Inability to sleep    Sexual problems  Home care    Try to find the sources of stress in your life. They may not be obvious. These may include:  ? Daily hassles of life (such as traffic jams, missed appointments, or car troubles)  ? Major life changes, both good (new baby or job promotion) and bad (loss of job or loss of loved one)  ? Overload (feeling that you have too many responsibilities and can't take care of all of them at once)  ? Feeling helpless or feeling that your problems can't be solved    Notice how your body reacts to stress. Learn to listen to  your body signals. This will help you take action before the stress becomes severe.    When you can, do something about the source of your stress. (Avoid hassles, limit the amount of change that happens in your life at one time, and take a break when you feel overloaded).    Unfortunately, many stressful situations can't be avoided. It is necessary to learn how to better manage stress. There are many proven methods that will reduce your anxiety. These include simple things such as exercise, good nutrition, and adequate rest. Also, there are certain techniques that are helpful:  ? Relaxation  ? Breathing exercises  ? Visualization  ? Biofeedback  ? Meditation  For more information about this, talk with your healthcare provider. Or check online or at your local library or bookstore. You'll find many books and audiobooks on this subject.   Follow-up care  If you feel your anxiety is not responding to self-help measures, call your healthcare provider or make an appointment with a counselor. You may need short-term psychological counseling or medicine to help you manage stress.   Call 911  Call 911 if any of these happen:     Trouble breathing    Confusion    Drowsiness or trouble waking up    Fainting or loss of consciousness    Rapid heart rate    Seizure    New chest pain that becomes more severe, lasts longer, or spreads into your shoulder, arm, neck, jaw, or back  When to get medical advice  Call your healthcare provider right away if any of these happen:    Your symptoms get worse    Severe headache not eased by rest and mild pain reliever  Jennifer last reviewed this educational content on 4/1/2020 2000-2021 [employer]. All rights reserved. This information is not intended as a substitute for professional medical care. Always follow your healthcare professional's instructions.           Patient Education     Depression: Tips to Help Yourself    As your healthcare providers help treat your  depression, you can also help yourself. Keep in mind that your illness affects you emotionally, physically, mentally, and socially. So full recovery will take time. Take care of your body and your soul, and be patient with yourself as you get better.  Self-care    Educate yourself. Read about treatment and medicine options. If you have the energy, attend local conferences or support groups. Keep a list of useful websites and helpful books and use them as needed. This illness is not your fault. Don t blame yourself for your depression.    Manage early symptoms. If you notice symptoms returning, experience triggers, or identify other factors that may lead to a depressive episode, get help as soon as possible. Ask trusted friends and family to monitor your behavior and let you know if they see anything of concern.    Work with your provider. Find a provider you can trust. Communicate honestly with that person and share information on your treatment for depression and your reaction to medicines.    Be prepared for a crisis. Know what to do if you experience a crisis. Keep the phone number of a crisis hotline and know the location of your community's urgent care centers and the closest emergency department.    Hold off on big decisions. Depression can cloud your judgment. So wait until you feel better before making major life decisions, such as changing jobs, moving, or getting  or .    Be patient. Recovering from depression is a process. Don t be discouraged if it takes some time to feel better.    Keep it simple. Depression saps your energy and concentration. So you won t be able to do all the things you used to do. Set small goals and do what you can.    Be with others. Don t isolate yourself--you ll only feel worse. Try to be with other people. And take part in fun activities when you can. Go to a movie, ballgame, Mu-ism service, or social event. Talk openly with people you can trust. And accept  help when it s offered.    Take care of your body  People with depression often lose the desire to take care of themselves. That only makes their problems worse. During treatment and afterward, make a point to:    Exercise. It s a great way to take care of your body. And studies have shown that exercise helps fight depression. Aim for 30 minutes of moderate activity a day. Walking in small blocks of time (5-10 minutes) is a good way to start, but anything that gets you moving (gardening, house cleaning) counts.    Don't use drugs and alcohol. These may ease the pain in the short term. But they ll only make your problems worse in the long run.    Get relief from stress. Ask your healthcare provider for relaxation exercises and techniques to help relieve stress. Consider activities like meditation, yoga, or Vinicius Chi.    Eat right. A balanced and healthy diet helps keep your body healthy.    Get adequate sleep. Aim for 8 hours per night. Too much or too little sleep can cause other physical and emotional problems.  [employer] last reviewed this educational content on 12/1/2019 2000-2021 [employer]. All rights reserved. This information is not intended as a substitute for professional medical care. Always follow your healthcare professional's instructions.           Patient Education     Viral Upper Respiratory Illness (Adult)    You have a viral upper respiratory illness (URI), which is another term for the common cold. This illness is contagious during the first few days. It is spread through the air by coughing and sneezing. It may also be spread by direct contact (touching the sick person and then touching your own eyes, nose, or mouth). Frequent handwashing will decrease risk of spread. Most viral illnesses go away within 7 to 10 days with rest and simple home remedies. Sometimes the illness may last for several weeks. Antibiotics will not kill a virus, and they are generally not prescribed for this  condition.  Home care    If symptoms are severe, rest at home for the first 2 to 3 days. When you resume activity, don't let yourself get too tired.    Don't smoke. If you need help stopping, talk with your healthcare provider.    Avoid being exposed to cigarette smoke (yours or others ).    You may use acetaminophen or ibuprofen to control pain and fever, unless another medicine was prescribed. If you have chronic liver or kidney disease, have ever had a stomach ulcer or gastrointestinal bleeding, or are taking blood-thinning medicines, talk with your healthcare provider before using these medicines. Aspirin should never be given to anyone under 18 years of age who is ill with a viral infection or fever. It may cause severe liver or brain damage.    Your appetite may be poor, so a light diet is fine. Stay well hydrated by drinking 6 to 8 glasses of fluids per day (water, soft drinks, juices, tea, or soup). Extra fluids will help loosen secretions in the nose and lungs.    Over-the-counter cold medicines will not shorten the length of time you re sick, but they may be helpful for the following symptoms: cough, sore throat, and nasal and sinus congestion. If you take prescription medicines, ask your healthcare provider or pharmacist which over-the-counter medicines are safe to use. (Note: Don't use decongestants if you have high blood pressure.)  Follow-up care  Follow up with your healthcare provider, or as advised.  When to seek medical advice  Call your healthcare provider right away if any of these occur:    Cough with lots of colored sputum (mucus)    Severe headache; face, neck, or ear pain    Difficulty swallowing due to throat pain    Fever of 100.4 F (38 C) or higher, or as directed by your healthcare provider  Call 911  Call 911 if any of these occur:    Chest pain, shortness of breath, wheezing, or difficulty breathing    Coughing up blood    Very severe pain with swallowing, especially if it goes along  with a muffled voice   Jennifer last reviewed this educational content on 6/1/2018 2000-2021 [employer]. All rights reserved. This information is not intended as a substitute for professional medical care. Always follow your healthcare professional's instructions.

## 2021-12-03 ASSESSMENT — ANXIETY QUESTIONNAIRES: GAD7 TOTAL SCORE: 0

## 2021-12-03 ASSESSMENT — PATIENT HEALTH QUESTIONNAIRE - PHQ9: SUM OF ALL RESPONSES TO PHQ QUESTIONS 1-9: 1

## 2022-02-21 ENCOUNTER — APPOINTMENT (OUTPATIENT)
Dept: CT IMAGING | Facility: CLINIC | Age: 42
End: 2022-02-21
Attending: EMERGENCY MEDICINE
Payer: COMMERCIAL

## 2022-02-21 ENCOUNTER — HOSPITAL ENCOUNTER (EMERGENCY)
Facility: CLINIC | Age: 42
Discharge: HOME | End: 2022-02-21
Attending: EMERGENCY MEDICINE | Admitting: EMERGENCY MEDICINE
Payer: COMMERCIAL

## 2022-02-21 VITALS
HEIGHT: 69 IN | RESPIRATION RATE: 18 BRPM | HEART RATE: 80 BPM | BODY MASS INDEX: 26.66 KG/M2 | TEMPERATURE: 97.4 F | SYSTOLIC BLOOD PRESSURE: 128 MMHG | DIASTOLIC BLOOD PRESSURE: 70 MMHG | OXYGEN SATURATION: 99 % | WEIGHT: 180 LBS

## 2022-02-21 DIAGNOSIS — S09.90XA CLOSED HEAD INJURY, INITIAL ENCOUNTER: ICD-10-CM

## 2022-02-21 DIAGNOSIS — S06.0X0A CONCUSSION WITHOUT LOSS OF CONSCIOUSNESS, INITIAL ENCOUNTER: ICD-10-CM

## 2022-02-21 DIAGNOSIS — R20.2 PARESTHESIAS: ICD-10-CM

## 2022-02-21 PROCEDURE — 70450 CT HEAD/BRAIN W/O DYE: CPT

## 2022-02-21 PROCEDURE — 72125 CT NECK SPINE W/O DYE: CPT

## 2022-02-21 PROCEDURE — 99283 EMERGENCY DEPT VISIT LOW MDM: CPT | Performed by: EMERGENCY MEDICINE

## 2022-02-21 PROCEDURE — 99284 EMERGENCY DEPT VISIT MOD MDM: CPT | Mod: 25 | Performed by: EMERGENCY MEDICINE

## 2022-02-21 NOTE — ED PROVIDER NOTES
History     Chief Complaint   Patient presents with     Head Injury     head injury yesterday. now complaining of tingling and numbness in bilateral hands, face, feet since this morning. Does not take blood thinner     HPI  Nas Trejo is a 41 year old male with no significant past medical history presents emergency department complaining of mild to moderate headache and tingling in bilateral fingers and hand status post trauma.  Patient was ice fishing in a nice house yesterday when he struck his head against a bunk hitting it significantly.  Patient saw stars but did not lose consciousness.  Denies any significant visual changes but states his headache was moderately significant any sore in the neck.  Did not think anything of it went home and this morning felt some tingling in his fingers and toes.  He has not had any nausea or vomiting denies any visual changes at this time he has not had any chest pain or shortness of breath denies abdominal pain or back pain.  He is not any focal numbness weakness in any extremity.  He is not on blood thinners.    Allergies:  No Known Allergies    Problem List:    Patient Active Problem List    Diagnosis Date Noted     Eczema, unspecified type 10/19/2016     Priority: Medium        Past Medical History:    No past medical history on file.    Past Surgical History:    Past Surgical History:   Procedure Laterality Date     ENT SURGERY       HERNIA REPAIR  2006     TONSILLECTOMY         Family History:    Family History   Problem Relation Age of Onset     Diabetes Father         type 2     Thyroid Disease Sister        Social History:  Marital Status:   [2]  Social History     Tobacco Use     Smoking status: Former Smoker     Packs/day: 0.50     Years: 20.00     Pack years: 10.00     Quit date: 2018     Years since quittin.1     Smokeless tobacco: Current User     Types: Chew   Vaping Use     Vaping Use: Never used   Substance Use Topics     Alcohol use:  "Yes     Comment: social     Drug use: No        Medications:    citalopram (CELEXA) 20 MG tablet  desoximetasone (TOPICORT) 0.25 % external cream  guaiFENesin (ORGANIDIN) 200 MG tablet  hydrOXYzine (ATARAX) 25 MG tablet          Review of Systems  All systems reviewed and other than pertinent positives and negatives in HPI all other systems are negative.  Physical Exam   BP: 128/70  Pulse: 80  Temp: 97.4  F (36.3  C)  Resp: 18  Height: 175.3 cm (5' 9\")  Weight: 81.6 kg (180 lb)  SpO2: 99 %      Physical Exam  Vitals and nursing note reviewed.   Constitutional:       General: He is not in acute distress.     Appearance: Normal appearance. He is not ill-appearing, toxic-appearing or diaphoretic.   HENT:      Head: Normocephalic.      Comments: Mild swelling and tenderness to the forehead no erythema edema no obvious step-off.     Nose: Nose normal.      Comments: No midface instability.     Mouth/Throat:      Comments: Bite is symmetrical.  Eyes:      Extraocular Movements: Extraocular movements intact.      Conjunctiva/sclera: Conjunctivae normal.      Pupils: Pupils are equal, round, and reactive to light.   Neck:      Comments: Mild posterior lateral neck tenderness bilaterally.  Cardiovascular:      Rate and Rhythm: Normal rate and regular rhythm.      Pulses: Normal pulses.      Heart sounds: Normal heart sounds. No murmur heard.  Pulmonary:      Effort: Pulmonary effort is normal.      Breath sounds: Normal breath sounds. No wheezing or rhonchi.   Abdominal:      General: Abdomen is flat. Bowel sounds are normal. There is no distension.      Palpations: Abdomen is soft.      Tenderness: There is no abdominal tenderness. There is no right CVA tenderness.   Musculoskeletal:         General: No swelling or tenderness. Normal range of motion.      Cervical back: Normal range of motion and neck supple.      Right lower leg: No edema.      Left lower leg: No edema.   Skin:     General: Skin is warm and dry.      " Capillary Refill: Capillary refill takes less than 2 seconds.      Findings: No rash.   Neurological:      Mental Status: He is alert.      Comments: Patient is alert and oriented x3 there is subjective tingling in the fingers bilaterally and hands symmetrical strength pulses symmetrical good capillary refill is present.   Psychiatric:         Mood and Affect: Mood normal.         ED Course                 Procedures              Critical Care time:  none                   Medications - No data to display  Results for orders placed or performed during the hospital encounter of 02/21/22   Head CT w/o contrast    Narrative    EXAM: CT HEAD W/O CONTRAST  LOCATION: [facility]  DATE/TIME: 2/21/2022 5:00 PM    INDICATION: Head trauma, focal neuro findings (Age 19-64y).  COMPARISON: None.  TECHNIQUE: Routine CT Head without IV contrast. Multiplanar reformats. Dose reduction techniques were used.    FINDINGS:  INTRACRANIAL CONTENTS: No intracranial hemorrhage, extraaxial collection, or mass effect.  No CT evidence of acute infarct. Normal parenchymal attenuation. Normal ventricles and sulci.     VISUALIZED ORBITS/SINUSES/MASTOIDS: No intraorbital abnormality. No paranasal sinus mucosal disease. No middle ear or mastoid effusion.    BONES/SOFT TISSUES: No acute abnormality.      Impression    IMPRESSION:  1.  No acute intracranial abnormality.   Cervical spine CT w/o contrast    Narrative    EXAM: CT CERVICAL SPINE W/O CONTRAST  LOCATION: [facility]  DATE/TIME: 2/21/2022 5:03 PM    INDICATION: Neck trauma, focal neurological deficit or paresthesia (age 16-64 years).    COMPARISON: None.    TECHNIQUE: Routine CT cervical spine without IV contrast. Multiplanar reformats. Dose reduction techniques were used.    FINDINGS: No evidence of acute fracture or posttraumatic subluxation. Slight reversal normal cervical lordosis and leftward tilting of the upper cervical spine.  Mild degenerative change. No high grade stenoses. Blebbing at the lung apices.      Impression    IMPRESSION: No evidence of acute fracture or posttraumatic subluxation.         Assessments & Plan (with Medical Decision Making) records were reviewed.  Due to patient's presentation risks and benefits of CT scan of head and cervical spine were discussed with patient.  He is in agreement with obtaining this.  These revealed no obvious acute abnormality.  At this time I do not think an MRI scan is warranted.  I have advised patient that he should observe symptoms closely and if any worsening numbness any weakness worsening pain or other symptoms present he should return for further evaluation and care.  Symptoms of concussion were discussed with patient.  He feels comfortable being discharged at this time.     I have reviewed the nursing notes.    I have reviewed the findings, diagnosis, plan and need for follow up with the patient.       Discharge Medication List as of 2/21/2022  6:35 PM          Final diagnoses:   Closed head injury, initial encounter   Paresthesias   Concussion without loss of consciousness, initial encounter       2/21/2022   [facility] EMERGENCY DEPT     Gregg Griffiths MD  02/23/22 0212

## 2022-02-22 NOTE — DISCHARGE INSTRUCTIONS
Return if symptoms worsen or new symptoms develop.  Follow-up with primary care physician next available.  Drink plenty of fluids take ibuprofen or Tylenol for pain.  Monitor paresthesias and if they are worsening please return for further evaluation and care.  No evidence of intracranial abnormality or neck abnormality.

## 2022-04-07 ENCOUNTER — TELEPHONE (OUTPATIENT)
Dept: FAMILY MEDICINE | Facility: CLINIC | Age: 42
End: 2022-04-07
Payer: COMMERCIAL

## 2022-04-08 NOTE — TELEPHONE ENCOUNTER
[place] [employer] - PE certification form received and routed to Dr. Bazan for review and signature.

## 2022-04-12 NOTE — TELEPHONE ENCOUNTER
Form completed, signed, and faxed to patient at [phone]. Copy of form sent to scan and copy placed in basket.

## 2022-05-27 ENCOUNTER — MYC REFILL (OUTPATIENT)
Dept: FAMILY MEDICINE | Facility: CLINIC | Age: 42
End: 2022-05-27
Payer: COMMERCIAL

## 2022-05-27 DIAGNOSIS — F41.1 GENERALIZED ANXIETY DISORDER: ICD-10-CM

## 2022-05-27 DIAGNOSIS — F32.0 MILD MAJOR DEPRESSION (H): ICD-10-CM

## 2022-05-27 RX ORDER — CITALOPRAM HYDROBROMIDE 20 MG/1
20 TABLET ORAL DAILY
Qty: 60 TABLET | Refills: 0 | Status: SHIPPED | OUTPATIENT
Start: 2022-05-27 | End: 2022-12-28

## 2022-08-29 DIAGNOSIS — F41.1 GENERALIZED ANXIETY DISORDER: ICD-10-CM

## 2022-08-29 DIAGNOSIS — F32.0 MILD MAJOR DEPRESSION (H): ICD-10-CM

## 2022-08-29 RX ORDER — CITALOPRAM HYDROBROMIDE 20 MG/1
20 TABLET ORAL DAILY
Qty: 30 TABLET | Refills: 1 | OUTPATIENT
Start: 2022-08-29

## 2022-08-29 NOTE — TELEPHONE ENCOUNTER
"Requested Prescriptions   Pending Prescriptions Disp Refills    citalopram (CELEXA) 20 MG tablet [Pharmacy Med Name: citalopram 20 mg tablet] 30 tablet 1     Sig: Take 1 tablet (20 mg) by mouth daily Disregard previous 40 mg dose.        SSRIs Protocol Failed - 8/29/2022  8:04 AM        Failed - PHQ-9 score less than 5 in past 6 months     Please review last PHQ-9 score.           Failed - Recent (6 mo) or future (30 days) visit within the authorizing provider's specialty     Patient had office visit in the last 6 months or has a visit in the next 30 days with authorizing provider or within the authorizing provider's specialty.  See \"Patient Info\" tab in inbasket, or \"Choose Columns\" in Meds & Orders section of the refill encounter.            Passed - Medication is active on med list        Passed - Patient is age 18 or older            Writer sent patient PHQ9 via happin!  "

## 2022-08-29 NOTE — TELEPHONE ENCOUNTER
Patient last filled Rx 5/27/2022 for 60 tablets. He would have been out a month ago.  Patient needs to see provider prior to refill as there is a gap in Rx use.

## 2022-08-30 NOTE — TELEPHONE ENCOUNTER
LM on patient VM to return call to Care Team for a message for patient from Dr. Bazan. Lizeth Fletcher on 8/30/2022 at 2:09 PM

## 2022-10-15 ENCOUNTER — HEALTH MAINTENANCE LETTER (OUTPATIENT)
Age: 42
End: 2022-10-15

## 2022-12-28 ENCOUNTER — OFFICE VISIT (OUTPATIENT)
Dept: FAMILY MEDICINE | Facility: CLINIC | Age: 42
End: 2022-12-28
Payer: COMMERCIAL

## 2022-12-28 VITALS
HEIGHT: 70 IN | BODY MASS INDEX: 27.23 KG/M2 | RESPIRATION RATE: 16 BRPM | OXYGEN SATURATION: 96 % | SYSTOLIC BLOOD PRESSURE: 110 MMHG | TEMPERATURE: 97.2 F | WEIGHT: 190.2 LBS | HEART RATE: 64 BPM | DIASTOLIC BLOOD PRESSURE: 70 MMHG

## 2022-12-28 DIAGNOSIS — Z00.00 ROUTINE GENERAL MEDICAL EXAMINATION AT A HEALTH CARE FACILITY: Primary | ICD-10-CM

## 2022-12-28 DIAGNOSIS — E78.2 MIXED HYPERLIPIDEMIA: ICD-10-CM

## 2022-12-28 DIAGNOSIS — M65.4 RADIAL STYLOID TENOSYNOVITIS OF LEFT HAND: ICD-10-CM

## 2022-12-28 DIAGNOSIS — Z13.1 SCREENING FOR DIABETES MELLITUS: ICD-10-CM

## 2022-12-28 DIAGNOSIS — Z13.220 SCREENING FOR HYPERLIPIDEMIA: ICD-10-CM

## 2022-12-28 DIAGNOSIS — Z11.59 NEED FOR HEPATITIS C SCREENING TEST: ICD-10-CM

## 2022-12-28 DIAGNOSIS — G47.9 SLEEP DISTURBANCES: ICD-10-CM

## 2022-12-28 DIAGNOSIS — Z11.4 SCREENING FOR HIV (HUMAN IMMUNODEFICIENCY VIRUS): ICD-10-CM

## 2022-12-28 DIAGNOSIS — F32.0 MILD MAJOR DEPRESSION (H): ICD-10-CM

## 2022-12-28 DIAGNOSIS — F41.1 GENERALIZED ANXIETY DISORDER: ICD-10-CM

## 2022-12-28 LAB
CHOLEST SERPL-MCNC: 242 MG/DL
FASTING STATUS PATIENT QL REPORTED: YES
GLUCOSE SERPL-MCNC: 102 MG/DL (ref 70–99)
HCV AB SERPL QL IA: NONREACTIVE
HDLC SERPL-MCNC: 56 MG/DL
HIV 1+2 AB+HIV1 P24 AG SERPL QL IA: NONREACTIVE
LDLC SERPL CALC-MCNC: 155 MG/DL
NONHDLC SERPL-MCNC: 186 MG/DL
TRIGL SERPL-MCNC: 153 MG/DL

## 2022-12-28 PROCEDURE — 99396 PREV VISIT EST AGE 40-64: CPT | Mod: 25 | Performed by: FAMILY MEDICINE

## 2022-12-28 PROCEDURE — 86803 HEPATITIS C AB TEST: CPT | Performed by: FAMILY MEDICINE

## 2022-12-28 PROCEDURE — 82947 ASSAY GLUCOSE BLOOD QUANT: CPT | Performed by: FAMILY MEDICINE

## 2022-12-28 PROCEDURE — 80061 LIPID PANEL: CPT | Performed by: FAMILY MEDICINE

## 2022-12-28 PROCEDURE — 36415 COLL VENOUS BLD VENIPUNCTURE: CPT | Performed by: FAMILY MEDICINE

## 2022-12-28 PROCEDURE — 99214 OFFICE O/P EST MOD 30 MIN: CPT | Mod: 25 | Performed by: FAMILY MEDICINE

## 2022-12-28 PROCEDURE — 87389 HIV-1 AG W/HIV-1&-2 AB AG IA: CPT | Performed by: FAMILY MEDICINE

## 2022-12-28 RX ORDER — CITALOPRAM HYDROBROMIDE 20 MG/1
20 TABLET ORAL DAILY
Qty: 90 TABLET | Refills: 3 | Status: SHIPPED | OUTPATIENT
Start: 2022-12-28

## 2022-12-28 ASSESSMENT — ENCOUNTER SYMPTOMS
HEARTBURN: 0
CONSTIPATION: 0
DIARRHEA: 0
SHORTNESS OF BREATH: 0
ABDOMINAL PAIN: 0
PARESTHESIAS: 0
MYALGIAS: 0
NERVOUS/ANXIOUS: 0
NAUSEA: 0
DYSURIA: 0
CHILLS: 0
DIZZINESS: 0
COUGH: 0
WEAKNESS: 0
PALPITATIONS: 0
ARTHRALGIAS: 1
HEMATOCHEZIA: 0
FEVER: 0
JOINT SWELLING: 0
SORE THROAT: 0
HEMATURIA: 0
EYE PAIN: 0
FREQUENCY: 0
HEADACHES: 0

## 2022-12-28 ASSESSMENT — ANXIETY QUESTIONNAIRES
6. BECOMING EASILY ANNOYED OR IRRITABLE: SEVERAL DAYS
IF YOU CHECKED OFF ANY PROBLEMS ON THIS QUESTIONNAIRE, HOW DIFFICULT HAVE THESE PROBLEMS MADE IT FOR YOU TO DO YOUR WORK, TAKE CARE OF THINGS AT HOME, OR GET ALONG WITH OTHER PEOPLE: NOT DIFFICULT AT ALL
3. WORRYING TOO MUCH ABOUT DIFFERENT THINGS: SEVERAL DAYS
GAD7 TOTAL SCORE: 4
7. FEELING AFRAID AS IF SOMETHING AWFUL MIGHT HAPPEN: NOT AT ALL
1. FEELING NERVOUS, ANXIOUS, OR ON EDGE: SEVERAL DAYS
GAD7 TOTAL SCORE: 4
2. NOT BEING ABLE TO STOP OR CONTROL WORRYING: SEVERAL DAYS
5. BEING SO RESTLESS THAT IT IS HARD TO SIT STILL: NOT AT ALL

## 2022-12-28 ASSESSMENT — PATIENT HEALTH QUESTIONNAIRE - PHQ9
5. POOR APPETITE OR OVEREATING: NOT AT ALL
SUM OF ALL RESPONSES TO PHQ QUESTIONS 1-9: 9
SUM OF ALL RESPONSES TO PHQ QUESTIONS 1-9: 9
10. IF YOU CHECKED OFF ANY PROBLEMS, HOW DIFFICULT HAVE THESE PROBLEMS MADE IT FOR YOU TO DO YOUR WORK, TAKE CARE OF THINGS AT HOME, OR GET ALONG WITH OTHER PEOPLE: NOT DIFFICULT AT ALL

## 2022-12-28 ASSESSMENT — PAIN SCALES - GENERAL: PAINLEVEL: NO PAIN (0)

## 2022-12-28 NOTE — PROGRESS NOTES
SUBJECTIVE:   CC: Nas is an 42 year old who presents for preventative health visit.   Patient has been advised of split billing requirements and indicates understanding: Yes  Healthy Habits:     Getting at least 3 servings of Calcium per day:  Yes    Bi-annual eye exam:  Yes    Dental care twice a year:  Yes    Sleep apnea or symptoms of sleep apnea:  None    Diet:  Regular (no restrictions)    Frequency of exercise:  2-3 days/week    Duration of exercise:  N/A    Taking medications regularly:  Yes    Medication side effects:  Other    PHQ-2 Total Score: 2    Additional concerns today:  No      6 months of feeling of weakness of left thumb when gripping or grasping something. No trauma.  No significant pain.  No home remedies tried.   Denies paresthesia.  Patient is right hand dominant.    Depression and Anxiety Follow-Up    How are you doing with your depression since your last visit? No change    How are you doing with your anxiety since your last visit?  No change    Are you having other symptoms that might be associated with depression or anxiety? No    Have you had a significant life event? No     Do you have any concerns with your use of alcohol or other drugs? No  On citalopram 20 mg daily. Has been cutting in half recently to make the last Rx last until appointment.  Denies any significant ADR to med.    Social History     Tobacco Use     Smoking status: Former     Packs/day: 0.50     Years: 20.00     Pack years: 10.00     Types: Cigarettes     Quit date: 2018     Years since quittin.9     Smokeless tobacco: Current     Types: Chew   Vaping Use     Vaping Use: Never used   Substance Use Topics     Alcohol use: Yes     Comment: social     Drug use: No     PHQ 3/18/2021 2021 2022   PHQ-9 Total Score 8 1 9   Q9: Thoughts of better off dead/self-harm past 2 weeks Not at all Not at all Not at all     COY-7 SCORE 3/18/2021 2021 2022   Total Score 6 0 4     Last PHQ-9 2022   1.   Little interest or pleasure in doing things 1   2.  Feeling down, depressed, or hopeless 1   3.  Trouble falling or staying asleep, or sleeping too much 3   4.  Feeling tired or having little energy 2   5.  Poor appetite or overeating 0   6.  Feeling bad about yourself 0   7.  Trouble concentrating 1   8.  Moving slowly or restless 1   Q9: Thoughts of better off dead/self-harm past 2 weeks 0   PHQ-9 Total Score 9   Difficulty at work, home, or with people -     COY-7  2022   1. Feeling nervous, anxious, or on edge 1   2. Not being able to stop or control worrying 1   3. Worrying too much about different things 1   4. Trouble relaxing 0   5. Being so restless that it is hard to sit still 0   6. Becoming easily annoyed or irritable 1   7. Feeling afraid, as if something awful might happen 0   COY-7 Total Score 4   If you checked any problems, how difficult have they made it for you to do your work, take care of things at home, or get along with other people? Not difficult at all       Suicide Assessment Five-step Evaluation and Treatment (SAFE-T)      Today's PHQ-2 Score:   PHQ-2 (  [employer]) 2022   Q1: Little interest or pleasure in doing things 1   Q2: Feeling down, depressed or hopeless 1   PHQ-2 Score 2   PHQ-2 Total Score (12-17 Years)- Positive if 3 or more points; Administer PHQ-A if positive -   Q1: Little interest or pleasure in doing things Several days   Q2: Feeling down, depressed or hopeless Several days   PHQ-2 Score 2         Social History     Tobacco Use     Smoking status: Former     Packs/day: 0.50     Years: 20.00     Pack years: 10.00     Types: Cigarettes     Quit date: 2018     Years since quittin.9     Smokeless tobacco: Current     Types: Chew   Substance Use Topics     Alcohol use: Yes     Comment: social     Still chews tobacco.    If you drink alcohol do you typically have >3 drinks per day or >7 drinks per week? Yes      Alcohol Use 2022   Prescreen: >3  drinks/day or >7 drinks/week? Yes   Prescreen: >3 drinks/day or >7 drinks/week? -   AUDIT SCORE  5     AUDIT - Alcohol Use Disorders Identification Test - Reproduced from the World Health Organization Audit 2001 (Second Edition) 2022   1.  How often do you have a drink containing alcohol? 4 or more times a week   2.  How many drinks containing alcohol do you have on a typical day when you are drinking? 1 or 2   3.  How often do you have five or more drinks on one occasion? Less than monthly   4.  How often during the last year have you found that you were not able to stop drinking once you had started? Never   5.  How often during the last year have you failed to do what was normally expected of you because of drinking? Never   6.  How often during the last year have you needed a first drink in the morning to get yourself going after a heavy drinking session? Never   7.  How often during the last year have you had a feeling of guilt or remorse after drinking? Never   8.  How often during the last year have you been unable to remember what happened the night before because of your drinking? Never   9.  Have you or someone else been injured because of your drinking? No   10. Has a relative, friend, doctor or other health care worker been concerned about your drinking or suggested you cut down? No   TOTAL SCORE 5       Last PSA: No results found for: PSA    Reviewed orders with patient. Reviewed health maintenance and updated orders accordingly - Yes  Patient Active Problem List   Diagnosis     Eczema, unspecified type     Past Surgical History:   Procedure Laterality Date     ENT SURGERY       HERNIA REPAIR  2006     TONSILLECTOMY         Social History     Tobacco Use     Smoking status: Former     Packs/day: 0.50     Years: 20.00     Pack years: 10.00     Types: Cigarettes     Quit date: 2018     Years since quittin.9     Smokeless tobacco: Current     Types: Chew   Substance Use Topics      Alcohol use: Yes     Comment: social     Family History   Problem Relation Age of Onset     Diabetes Father         type 2     Thyroid Disease Sister          Current Outpatient Medications   Medication Sig Dispense Refill     citalopram (CELEXA) 20 MG tablet Take 1 tablet (20 mg) by mouth daily Disregard previous 40 mg dose. 90 tablet 3     desoximetasone (TOPICORT) 0.25 % external cream Apply sparingly to affected area twice daily 60 g 3     hydrOXYzine (ATARAX) 25 MG tablet Take 1-2 tablets (25-50 mg) by mouth nightly as needed for other (insomnia) 30 tablet 0     guaiFENesin (ORGANIDIN) 200 MG tablet Take 1 tablet (200 mg) by mouth every 4 hours as needed for cough (Patient not taking: Reported on 12/28/2022)       No Known Allergies    Reviewed and updated as needed this visit by clinical staff   Tobacco  Allergies  Meds              Reviewed and updated as needed this visit by Provider     Meds             No past medical history on file.   Past Surgical History:   Procedure Laterality Date     ENT SURGERY       HERNIA REPAIR  01/01/2006     TONSILLECTOMY         Review of Systems   Constitutional: Negative for chills and fever.   HENT: Negative for congestion, ear pain, hearing loss and sore throat.    Eyes: Negative for pain and visual disturbance.   Respiratory: Negative for cough and shortness of breath.    Cardiovascular: Negative for chest pain, palpitations and peripheral edema.   Gastrointestinal: Negative for abdominal pain, constipation, diarrhea, heartburn, hematochezia and nausea.   Genitourinary: Negative for dysuria, frequency, genital sores, hematuria, impotence, penile discharge and urgency.   Musculoskeletal: Positive for arthralgias. Negative for joint swelling and myalgias.   Skin: Negative for rash.   Neurological: Negative for dizziness, weakness, headaches and paresthesias.   Psychiatric/Behavioral: Negative for mood changes. The patient is not nervous/anxious.          OBJECTIVE:  "  /70 (BP Location: Left arm, Patient Position: Chair, Cuff Size: Adult Large)   Pulse 64   Temp 97.2  F (36.2  C) (Tympanic)   Resp 16   Ht 1.765 m (5' 9.5\")   Wt 86.3 kg (190 lb 3.2 oz)   SpO2 96%   BMI 27.68 kg/m      Physical Exam  GENERAL APPEARANCE: healthy, alert and no distress  EYES: pink conj, no icterus, PERRL, EOMI  HENT: ear canals and TM's normal, nose and mouth without ulcers or lesions, oropharynx clear and oral mucous membranes moist  NECK: no adenopathy, no asymmetry, masses, or scars and thyroid normal to palpation  RESP: lungs clear to auscultation - no rales, rhonchi or wheezes  CV: regular rates and rhythm, normal S1 S2, no S3 or S4, no murmur, click or rub, no peripheral edema and peripheral pulses strong  ABDOMEN: soft, nontender, no hepatosplenomegaly, no masses and bowel sounds normal  RECTAL: normal sphincter tone, no rectal masses, prostate slightly enlarged but smooth, soft and nontender  MS: no musculoskeletal defects are noted and gait is age appropriate without ataxia  SKIN: no suspicious lesions or rashes  NEURO: Normal strength and tone, sensory exam grossly normal, mentation intact and speech normal  PSYCH: lineawr thought process, normal mood and affect, no aggression, no suicidality    LEFT WRIST/HAND: no swelling/discoloration/deformity; full range of motion of all joints with no pain; no hypoesthesia; no thenar atrophy;  strong radial pulse; mildly positive Finkelstein    SKIN: no suspicious lesions, no rashes  Diagnostic Test Results:  none     ASSESSMENT/PLAN:   Nas was seen today for physical, depression and imm/inj.    Diagnoses and all orders for this visit:    Routine general medical examination at a health care facility  Patient was advised on recommended screening and preventive health recommendations.  He verbalized understanding and agreed to the plans below.    Mild major depression (H)  -     citalopram (CELEXA) 20 MG tablet; Take 1 tablet (20 mg) by " mouth daily Disregard previous 40 mg dose.  -     Adult Sleep Eval & Management  Referral; Future  Stable mood.  Continue current med dose.  Suicidal precautions reinforced.    Generalized anxiety disorder  -     citalopram (CELEXA) 20 MG tablet; Take 1 tablet (20 mg) by mouth daily Disregard previous 40 mg dose.  -     Adult Sleep Eval & Management  Referral; Future  Stable.  Reinforced non-medical means to manage and cope with stress.  Keep active; exercise regularly.  Return precautions discussed and given to patient.      Sleep disturbances  -     Adult Sleep Eval & Management  Referral; Future  Advised possible causes: shift-work sleep disorder, psychogenic insomnia, sleep apnea.  Already being treated for COY and depression.  Patient agreed to pursue sleep clinic consult.    Radial styloid tenosynovitis of left hand  No severe signs on exam.  Advised pathophysiology of overuse injuries.  Discussed use of thumb spica splint. Patient will purchase on his own.  Advised to reduce repetitive movements of the thumb.  He deferred OT referral.  Return precautions discussed and given to patient.     Screening for HIV (human immunodeficiency virus)  -     HIV Antigen Antibody Combo; Future  -     HIV Antigen Antibody Combo    Need for hepatitis C screening test  -     Hepatitis C Screen Reflex to HCV RNA Quant and Genotype; Future  -     Hepatitis C Screen Reflex to HCV RNA Quant and Genotype    Screening for hyperlipidemia  -     Lipid panel reflex to direct LDL Non-fasting; Future  -     Lipid panel reflex to direct LDL Non-fasting    Screening for diabetes mellitus  -     Glucose; Future  -     Glucose    Other orders  -     REVIEW OF HEALTH MAINTENANCE PROTOCOL ORDERS        Patient has been advised of split billing requirements and indicates understanding: Yes      COUNSELING:   Reviewed preventive health counseling, as reflected in patient instructions      BMI:   Estimated body mass  "index is 27.68 kg/m  as calculated from the following:    Height as of this encounter: 1.765 m (5' 9.5\").    Weight as of this encounter: 86.3 kg (190 lb 3.2 oz).   Weight management plan: Discussed healthy diet and exercise guidelines      He reports that he quit smoking about 4 years ago. He has a 10.00 pack-year smoking history. His smokeless tobacco use includes chew.        Jax Bazan MD  [facility]  Answers for HPI/ROS submitted by the patient on 12/28/2022  If you checked off any problems, how difficult have these problems made it for you to do your work, take care of things at home, or get along with other people?: Not difficult at all  PHQ9 TOTAL SCORE: 9      "

## 2023-04-06 ENCOUNTER — ANCILLARY PROCEDURE (OUTPATIENT)
Dept: GENERAL RADIOLOGY | Facility: CLINIC | Age: 43
End: 2023-04-06
Attending: FAMILY MEDICINE
Payer: COMMERCIAL

## 2023-04-06 ENCOUNTER — OFFICE VISIT (OUTPATIENT)
Dept: ORTHOPEDICS | Facility: CLINIC | Age: 43
End: 2023-04-06
Payer: COMMERCIAL

## 2023-04-06 VITALS
BODY MASS INDEX: 27.77 KG/M2 | HEIGHT: 70 IN | WEIGHT: 194 LBS | DIASTOLIC BLOOD PRESSURE: 82 MMHG | SYSTOLIC BLOOD PRESSURE: 130 MMHG

## 2023-04-06 DIAGNOSIS — M76.02 GLUTEAL TENDINITIS OF LEFT BUTTOCK: ICD-10-CM

## 2023-04-06 DIAGNOSIS — M25.552 PAIN OF LEFT HIP: ICD-10-CM

## 2023-04-06 DIAGNOSIS — M25.552 PAIN OF LEFT HIP: Primary | ICD-10-CM

## 2023-04-06 DIAGNOSIS — Z87.312 HX OF STRESS FRACTURE: ICD-10-CM

## 2023-04-06 DIAGNOSIS — M95.5 PELVIC OBLIQUITY: ICD-10-CM

## 2023-04-06 PROCEDURE — 73502 X-RAY EXAM HIP UNI 2-3 VIEWS: CPT | Mod: TC | Performed by: RADIOLOGY

## 2023-04-06 PROCEDURE — 99204 OFFICE O/P NEW MOD 45 MIN: CPT | Performed by: FAMILY MEDICINE

## 2023-04-06 NOTE — PROGRESS NOTES
"Nas Trejo  :  1980  DOS: 2023  MRN: [id number]    Sports Medicine Clinic Visit    PCP: Jax Bazan    Nas Trejo is a 42 year old male who is seen as a self referral presenting with acute on chronic left hip pain.    Injury: Gradual onset of constant aching pain over the past 3+ months.  Pain located over left posterior lateral hip, radiating to anterior lateral thigh.  Additional Features:  Positive: snapping, clicking, & joint stiffness.  Symptoms are better with activity modification, changing positions, tylenol.  Symptoms are worse with: prolonged sitting & standing, running, quick movement, lying on either hip, IR of hip.  Other evaluation and/or treatments so far consists of: Ice, Heat, Tylenol, Ibuprofen, Rest and stretching.  Recent imaging completed: No recent imaging completed.  Prior History of related problems: history of left hip, femoral neck stress injury in , while he was in the .  This was treated conservatively at that time.  He has intermittent discomfort over the past 5+ years with activity that generally resolved after 2 - 3 days of rest and using OTC pain medications.    Social History: currently employed as     Review of Systems  Musculoskeletal: as above  Remainder of review of systems is negative including constitutional, CV, pulmonary, GI, Skin and Neurologic except as noted in HPI or medical history.    No past medical history on file.  Past Surgical History:   Procedure Laterality Date     ENT SURGERY       HERNIA REPAIR  2006     TONSILLECTOMY       Family History   Problem Relation Age of Onset     Diabetes Father         type 2     Thyroid Disease Sister      Objective  /82   Ht 1.765 m (5' 9.5\")   Wt 88 kg (194 lb)   BMI 28.24 kg/m        General: healthy, alert and in no distress      HEENT: no scleral icterus or conjunctival erythema     Skin: no suspicious lesions or rash. No jaundice.     CV: regular " rhythm by palpation, 2+ distal pulses, no pedal edema      Resp: normal respiratory effort without conversational dyspnea     Psych: normal mood and affect      Gait: minimally antalgic, appropriate coordination and balance     Neuro: normal light touch sensory exam of the extremities. Motor strength as noted below     Left hip exam    Inspection:        no edema or ecchymosis in hip area    ROM:       Full active and passive ROM, very mild pain with terminal flexion and IR    Strength:        flexion 5/5       extension 5/5       abduction 5/5       adduction 5/5    Tender:        greater trochanter    Non Tender:        remainder of hip area    Sensation:        grossly intact in hip and thigh    Skin:       well perfused       capillary refill brisk    Special Tests:        neg (-) ABEL       neg (-) FADIR       neg (-) scour       Mildly painful Bernie    Radiology  Recent Results (from the past 744 hour(s))   XR Pelvis w Hip Left 1 View    Narrative    PELVIS AND HIP LEFT 1 VIEW   4/6/2023 9:16 AM     HISTORY: Pain of left hip.    COMPARISON: None.      Impression    IMPRESSION: Normal bones, joint spaces and alignment. No fracture or  osteonecrosis.    STACIE LINTON MD         SYSTEM ID:  [id number]       Assessment:  1. Pain of left hip    2. Hx of stress fracture    3. Pelvic obliquity        Plan:  Discussed the assessment with the patient.  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, had a long period of time with no pain, but reports always having a limp since then  On exam hip joint is not particularly irritable, ongoing mild gluteal tendon/bursa pain  Reviewed option for CSI to lateral hip but defer for now and hopeful to avoid  XR images independently visualized and reviewed with patient today in clinic  No significant degenerative change or signs of nonunion/old fracture, no acute findings  PT options  and low impact and core building strategies like Pilates reviewed  Goal will be to work on hip/core strengthening and stability, correcting chronic obliquity from limp, hopefully gait retraining to decrease limping overall  We discussed modified progressive pain-free activity as tolerated  Home handouts provided and supportive care reviewed  All questions were answered today  Contact us with additional questions or concerns  Signs and sx of concern reviewed      Sudhir Kaplan DO, JENNIFER  Sports Medicine Physician  [facility]      Time spent in chart review, one-on-one evaluation, discussion with patient regarding: nature of problem, clinical course, prior treatments, therapeutic options, shared-decision making, potential procedures and referrals, and charting related to the visit: 32 minutes.  If applicable, time does not include time spent performing any procedure.          Disclaimer: This note consists of symbols derived from keyboarding, dictation and/or voice recognition software. As a result, there may be errors in the script that have gone undetected. Please consider this when interpreting information found in this chart.

## 2023-04-06 NOTE — LETTER
2023         RE: Nas [place]  [id number] [facility] [address]        Dear Colleague,    Thank you for referring your patient, Nas Trejo, to the [facility] [place]. Please see a copy of my visit note below.    Nas Trejo  :  1980  DOS: 2023  MRN: [id number]    Sports Medicine Clinic Visit    PCP: Jax Bazan    Nas Trejo is a 42 year old male who is seen as a self referral presenting with acute on chronic left hip pain.    Injury: Gradual onset of constant aching pain over the past 3+ months.  Pain located over left posterior lateral hip, radiating to anterior lateral thigh.  Additional Features:  Positive: snapping, clicking, & joint stiffness.  Symptoms are better with activity modification, changing positions, tylenol.  Symptoms are worse with: prolonged sitting & standing, running, quick movement, lying on either hip, IR of hip.  Other evaluation and/or treatments so far consists of: Ice, Heat, Tylenol, Ibuprofen, Rest and stretching.  Recent imaging completed: No recent imaging completed.  Prior History of related problems: history of left hip, femoral neck stress injury in , while he was in the .  This was treated conservatively at that time.  He has intermittent discomfort over the past 5+ years with activity that generally resolved after 2 - 3 days of rest and using OTC pain medications.    Social History: currently employed as     Review of Systems  Musculoskeletal: as above  Remainder of review of systems is negative including constitutional, CV, pulmonary, GI, Skin and Neurologic except as noted in HPI or medical history.    No past medical history on file.  Past Surgical History:   Procedure Laterality Date     ENT SURGERY       HERNIA REPAIR  2006     TONSILLECTOMY       Family History   Problem Relation Age of Onset     Diabetes Father         type 2     Thyroid Disease Sister   "    Objective  /82   Ht 1.765 m (5' 9.5\")   Wt 88 kg (194 lb)   BMI 28.24 kg/m        General: healthy, alert and in no distress      HEENT: no scleral icterus or conjunctival erythema     Skin: no suspicious lesions or rash. No jaundice.     CV: regular rhythm by palpation, 2+ distal pulses, no pedal edema      Resp: normal respiratory effort without conversational dyspnea     Psych: normal mood and affect      Gait: minimally antalgic, appropriate coordination and balance     Neuro: normal light touch sensory exam of the extremities. Motor strength as noted below     Left hip exam    Inspection:        no edema or ecchymosis in hip area    ROM:       Full active and passive ROM, very mild pain with terminal flexion and IR    Strength:        flexion 5/5       extension 5/5       abduction 5/5       adduction 5/5    Tender:        greater trochanter    Non Tender:        remainder of hip area    Sensation:        grossly intact in hip and thigh    Skin:       well perfused       capillary refill brisk    Special Tests:        neg (-) ABEL       neg (-) FADIR       neg (-) scour       Mildly painful Bernie    Radiology  Recent Results (from the past 744 hour(s))   XR Pelvis w Hip Left 1 View    Narrative    PELVIS AND HIP LEFT 1 VIEW   4/6/2023 9:16 AM     HISTORY: Pain of left hip.    COMPARISON: None.      Impression    IMPRESSION: Normal bones, joint spaces and alignment. No fracture or  osteonecrosis.    STACIE LINTON MD         SYSTEM ID:  [id number]       Assessment:  1. Pain of left hip    2. Hx of stress fracture    3. Pelvic obliquity        Plan:  Discussed the assessment with the patient.  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, had a long period of time with no pain, but reports always having a limp since then  On exam hip joint is not particularly irritable, ongoing mild gluteal tendon/bursa " pain  Reviewed option for CSI to lateral hip but defer for now and hopeful to avoid  XR images independently visualized and reviewed with patient today in clinic  No significant degenerative change or signs of nonunion/old fracture, no acute findings  PT options and low impact and core building strategies like Pilates reviewed  Goal will be to work on hip/core strengthening and stability, correcting chronic obliquity from limp, hopefully gait retraining to decrease limping overall  We discussed modified progressive pain-free activity as tolerated  Home handouts provided and supportive care reviewed  All questions were answered today  Contact us with additional questions or concerns  Signs and sx of concern reviewed      Sudhir Kaplan DO, JENNIFER  Sports Medicine Physician  [facility]      Time spent in chart review, one-on-one evaluation, discussion with patient regarding: nature of problem, clinical course, prior treatments, therapeutic options, shared-decision making, potential procedures and referrals, and charting related to the visit: 32 minutes.  If applicable, time does not include time spent performing any procedure.          Disclaimer: This note consists of symbols derived from keyboarding, dictation and/or voice recognition software. As a result, there may be errors in the script that have gone undetected. Please consider this when interpreting information found in this chart.      Again, thank you for allowing me to participate in the care of your patient.        Sincerely,        Sudhir Kaplan DO

## 2023-04-17 PROBLEM — F41.1 GAD (GENERALIZED ANXIETY DISORDER): Chronic | Status: ACTIVE | Noted: 2023-04-17

## 2023-04-17 PROBLEM — F32.A DEPRESSION: Chronic | Status: ACTIVE | Noted: 2023-04-17

## 2023-04-17 ASSESSMENT — SLEEP AND FATIGUE QUESTIONNAIRES
HOW LIKELY ARE YOU TO NOD OFF OR FALL ASLEEP IN A CAR, WHILE STOPPED FOR A FEW MINUTES IN TRAFFIC: WOULD NEVER DOZE
HOW LIKELY ARE YOU TO NOD OFF OR FALL ASLEEP WHEN YOU ARE A PASSENGER IN A CAR FOR AN HOUR WITHOUT A BREAK: MODERATE CHANCE OF DOZING
HOW LIKELY ARE YOU TO NOD OFF OR FALL ASLEEP WHILE SITTING INACTIVE IN A PUBLIC PLACE: WOULD NEVER DOZE
HOW LIKELY ARE YOU TO NOD OFF OR FALL ASLEEP WHILE SITTING AND TALKING TO SOMEONE: WOULD NEVER DOZE
HOW LIKELY ARE YOU TO NOD OFF OR FALL ASLEEP WHILE WATCHING TV: WOULD NEVER DOZE
HOW LIKELY ARE YOU TO NOD OFF OR FALL ASLEEP WHILE SITTING QUIETLY AFTER LUNCH WITHOUT ALCOHOL: SLIGHT CHANCE OF DOZING
HOW LIKELY ARE YOU TO NOD OFF OR FALL ASLEEP WHILE LYING DOWN TO REST IN THE AFTERNOON WHEN CIRCUMSTANCES PERMIT: SLIGHT CHANCE OF DOZING
HOW LIKELY ARE YOU TO NOD OFF OR FALL ASLEEP WHILE SITTING AND READING: SLIGHT CHANCE OF DOZING

## 2023-04-18 ENCOUNTER — VIRTUAL VISIT (OUTPATIENT)
Dept: SLEEP MEDICINE | Facility: CLINIC | Age: 43
End: 2023-04-18
Attending: FAMILY MEDICINE
Payer: COMMERCIAL

## 2023-04-18 DIAGNOSIS — R06.00 DYSPNEA AND RESPIRATORY ABNORMALITY: Primary | ICD-10-CM

## 2023-04-18 DIAGNOSIS — F41.1 GENERALIZED ANXIETY DISORDER: ICD-10-CM

## 2023-04-18 DIAGNOSIS — R53.81 MALAISE AND FATIGUE: ICD-10-CM

## 2023-04-18 DIAGNOSIS — R53.83 MALAISE AND FATIGUE: ICD-10-CM

## 2023-04-18 DIAGNOSIS — G47.00 INSOMNIA, UNSPECIFIED TYPE: ICD-10-CM

## 2023-04-18 DIAGNOSIS — F32.0 MILD MAJOR DEPRESSION (H): ICD-10-CM

## 2023-04-18 DIAGNOSIS — R06.89 DYSPNEA AND RESPIRATORY ABNORMALITY: Primary | ICD-10-CM

## 2023-04-18 DIAGNOSIS — G47.9 SLEEP DISTURBANCES: ICD-10-CM

## 2023-04-18 DIAGNOSIS — R06.89 GASPING FOR BREATH: ICD-10-CM

## 2023-04-18 DIAGNOSIS — Z72.820 LACK OF ADEQUATE SLEEP: ICD-10-CM

## 2023-04-18 PROCEDURE — 99204 OFFICE O/P NEW MOD 45 MIN: CPT | Mod: VID | Performed by: PHYSICIAN ASSISTANT

## 2023-04-18 ASSESSMENT — PAIN SCALES - GENERAL: PAINLEVEL: MILD PAIN (2)

## 2023-04-18 NOTE — PATIENT INSTRUCTIONS
Read the book SAY GOOD NIGHT TO INSOMNIA by Uzair Jalloh    MY CONTACT NUMBERS ARE: [phone]  DOCTOR : CORNELL Felder  SLEEP CENTER :   CPAP EQUIPMENT :    IF I HAVE SLEEP APNEA.....  WHERE CAN I FIND MORE INFORMATION?    American Academy of [employer] Patient information on sleep disorders:  http://yoursleep.aasmnet.org    THINGS TO REMEMBER  In most situations, sleep apnea is a lifelong disease that must be managed with daily therapy. Untreated disease, when severe, may result in an increased risk for an array of problems from heart disease to mood changes, car accidents and shorter lifespan.    CPAP -  WHY AND HOW?  Continuous positive airway pressure, or CPAP, is the most effective treatment for obstructive sleep apnea. A decision to use CPAP is a major step forward in the pursuit of a healthier life. The successful use of CPAP will help you breathe easier, sleep better and live healthier. Using CPAP can be a positive experience if you keep these nguyen points in mind:  Commitment  CPAP is not a quick fix for your problem. It involves a long-term commitment to improve your sleep and your health.    Communication  Stay in close communication with both your sleep doctor and your CPAP supplier. Ask lots of questions and seek help when you need it.    Consistency  Use CPAP all night, every night and for every nap. You will receive the maximum health benefits from CPAP when you use it every time that you sleep. This will also make it easier for your body to adjust to the treatment.    Correction  The first machine and mask that you try may not be the best ones for you. Work with your sleep doctor and your CPAP supplier to make corrections to your equipment selection. Ask about trying a different type of machine or mask if you have ongoing problems. Make sure that your mask is a good fit and learn to use your equipment properly.    Challenge  Tell a family member or close friend to ask you each morning if  "you used your CPAP the previous night. Have someone to challenge you to give it your best effort.    Connection   Your adjustment to CPAP will be easier if you are able to connect with others who use the same treatment. Ask your sleep doctor if there is a support group in your area for people who have sleep apnea, or look for one on the Internet.    Comfort   Increase your level of comfort by using a saline spray, decongestant or heated humidifier if CPAP irritates your nose, mouth or throat. Use your unit's \"ramp\" setting to slowly get used to the air pressure level. There may be soft pads you can buy that will fit over your mask straps. Look on www.CPAP.com for accessories such as these straps, a pillow contoured for side-sleeping with CPAP, longer hoses, hose covers to reduce condensation, or stands to keep the hose out of your way.                                 Cleaning   Clean your mask, tubing and headgear on a regular basis. Put this time in your schedule so that you don't forget to do it. Check and replace the filters for your CPAP unit and humidifier.    Completion   Although you are never finished with CPAP therapy, you should reward yourself by celebrating the completion of your first month of treatment. Expect this first month to be your hardest period of adjustment. It will involve some trial and error as you find the machine, mask and pressure settings that are right for you.    Continuation  After your first month of treatment, continue to make a daily commitment to use your CPAP all night, every night and for every nap.    CPAP-Tips to starting with success:  Begin using your CPAP for short periods of time during the day while you watch TV or read.    Use CPAP every night and for every nap. Using it less often reduces the health benefits and makes it harder for your body to get used to it.    Newer CPAP models are virtually silent; however, if you find the sound of your CPAP machine to be " bothersome, place the unit under your bed to dampen the sound.     Make small adjustments to your mask, tubing, straps and headgear until you get the right fit. Tightening the mask may actually worsen the leak.  If it leaves significant marks on your face or irritates the bridge of your nose, it may not be the best mask for you.  Speak with the person who supplied the mask and consider trying other masks.    Use a saline nasal spray to ease mild nasal congestion. Neti-Pot or saline nasal rinses may also help. Nasal gel sprays can help reduce nasal dryness.  Biotene mouthwash can be helpful to protect your teeth if you experience frequent dry mouth.  Dry mouth may be a sign of air escaping out of your mouth or out of the mask in the case of a full face mask.  Speak with your provider if you expect that is the case.     Take a nasal decongestant to relieve more severe nasal or sinus congestion.  Do not use Afrin (oxymetazoline) nasal spray more than 3 days in a row.  Speak with your sleep doctor if your nasal congestion is chronic.    Use a heated humidifier that fits your CPAP model to enhance your breathing comfort. Adjust the heat setting up if you get a dry nose or throat, down if you get condensation in the hose or mask.  Position the CPAP lower than you so that any condensation in the hose drains back into the machine rather than towards the mask.    Try a system that uses nasal pillows if traditional masks give you problems.    Clean your mask, tubing and headgear once a week. Make sure the equipment dries fully.    Regularly check and replace the filters for your CPAP unit and humidifier.    Work closely with your sleep doctor and your CPAP supplier to make sure that you have the machine, mask and air pressure setting that works best for you.    BESIDES CPAP, WHAT OTHER THERAPIES ARE THERE?  Postioning devices if you only have the problem on your back  Dental devices if your condition is mild  Nasal valves may  be effective though experience is limited  Tongue Retaining Device if missing teeth precludes the use of a dental device  Weight loss if you are overweight  Surgery in limited cases where devices are not acceptable or there are problems with structures in the nose and throat  If treated with one of these alternative options, further evaluation is necessary to ensure that the therapy is effective. This may require some form of testing.     Healthy Lifestyle:  Healthy diet, exercise and Limit alcohol: Not only will excessive alcohol increase your weight over time, but it irritates the throat tissues and make them swell, shrinking the airway and causing snoring. Drinking alcohol should be limited and stopped within 3-4 hours before going to bed.   Stop smoking: (Red swollen throat, heat, nicotine), also irritates and swells the airway, among numerous other negative health consequences.    Positioning Device  This example shows a pillow that straps around the waist. It may be appropriate for those whose sleep study shows milder sleep apnea that occurs primarily when lying flat on one's back. Preliminary studies have shown benefit but effectiveness at home should be verified.    Nasal Valves              Nasal valves may not be effective if you have frequent nasal congestion or have difficulty breathing through your nose. They may be an option for mild apnea if other options are not well tolerated. The efficacy of these devices is generally less than CPAP or oral appliances.  Oral Appliance  These are examples of two of many custom-made devices that are more likely to work in mild sleep apnea  Oral appliances are dental mouth pieces that fit very much like a sports mouth guards or removable orthodontic retainers. They are used to treat snoring  And obstructive sleep apnea . The device prevents the airway from collapsing by either holding the tongue or supporting the jaw in a forward position. Since oral appliances are  non-invasive and easy to use, they may be considered as an early treatment option. Oral appliance therapy (OAT) involves the customization, selection, fabrication, fitting, adjustments and long-term follow-up care of specially designed oral devices, worn during sleep, which reposition the lower jaw and tongue base forward to maintain an open airway.  Custom made oral appliances are proven to be more effective than over-the-counter devices. Therefore, the over-the-counter devices are recommended not to be used as a screening tool nor as a therapeutic option.  Who gets a dental device?  Oral appliance therapy can be used as an alternative to  CPAP therapy for the treatment of mild to moderate sleep apnea and for those patients who prefer OAT to CPAP. Oral appliance therapy is a first line therapy for the treatment of primary snoring. Additionally, OAT is an option for those that cannot tolerate CPAP as therapy or who have experienced insufficient surgical results.    Possible side effects?  Frequent but minor side effects include: excessive salivation, dry mouth, discomfort of teeth and jaw and temporary changes in the patient s bite.  Potential complications include: jaw pain, permanent occlusal changes and TMJ symptoms.  The above mentioned side effects and complications can be recognized and managed by dentists trained in dental sleep medicine.    Finding a dentist that practices dental sleep medicine  Specific training is available through the American Academy of Dental Sleep Medicine for dentists interested in working in the field of sleep. To find a dentist who is educated in the field of sleep and the use of oral appliances, near you, visit the Web site of the American Academy of Dental Sleep Medicine; also see http://www.accpstorage.org/newOrganization/patients/oralAppliances.pdf   To search for a dentist certified in these  practices:  Http://aadsm.org/FindADentist.aspx?[email or web address]  [email or web address].accpstorage.org/newOrganization/patients/[email or web address]    Tongue Retaining Device               Tongue Retaining Devices are devices that generally  suction cup  onto the tongue preventing it from falling back into the back of the throat during sleep.  They may be an option for people missing teeth, but can be uncomfortable. This particular device can be purchased online, but similar devices made by dentists fit more precisely and may be tolerated better. In general, they are rarely effective and often not very well tolerated.    Weight Loss:  Some patients may experience reduction or elimination of sleep apnea with weight loss.  Though there are significant health benefits from weight loss, long-term weight loss is very difficult to achieve- studies show success with dietary management in less that 10% of people.     If you are interested in dietary weight loss, you should review the options discussed at the National Institutes of Health patient information site:     Http:/www.health.nih.gov/topic/WeightLossDieting    Bariatric programs offer counseling in all methods of weight loss:    Http:/www.[facility].org/Specialties/WeightLossSurgeryandMedicalMgmt/htm    Surgery:  There are a number of surgeries that have been attempted to treat apnea. In general, surgical options are usually reserved for cases in which there is a physical abnormality contributing to obstruction or other treatment options are ineffective or not tolerated. Most surgical options are either unreliable or quite invasive. One of the more common procedures is:  Uvulopalatopharyngoplasty: In this procedure, the uvula (the finger-like tissue that hangs in the back of the throat), part of the soft palate (the tissue that the uvula is attached to), and sometimes the tonsils or adenoids are removed. The efficacy of this surgery is around 30-50% .  After surgery, complications may  "include:  Sleepiness and sleep apnea related to post-surgery medication   Swelling, infection and bleeding   A sore throat and/or difficulty swallowing   Drainage of secretions into the nose and a nasal quality to the voice. English language speech does not seem to be affected by this surgery.   Narrowing of the airway in the nose and throat (hence constricting breathing) snoring and even iatrogenically caused sleep apnea. By cutting the tissues, excess scar tissue can \"tighten\" the airway and make it even smaller than it was before UPPP.  Patients who have had the uvula removed will become unable to correctly speak Yoruba or any other language that has a uvular 'r' phoneme.    Surgeries to help resolve nasal congestion may help reduce the severity of apnea slightly. Nasal congestion does not cause apnea on its own, so these surgeries are usually not performed just for LAVERN.  They may be worth considering if the nasal congestion is significantly bothersome independent of apnea.   "

## 2023-04-18 NOTE — PROGRESS NOTES
Virtual Visit Details    Type of service:  Video Visit     Originating Location (pt. Location): Home    Distant Location (provider location):  On-site  Platform used for Video Visit: [facility]       Outpatient Sleep Medicine Consultation:      Name: Nas Trejo MRN# [id number]   Age: 42 year old YOB: 1980     Date of Consultation: April 18, 2023  Consultation is requested by: Jax Bazan MD  [address] [place] [address] Jax Bazan  Primary care provider: Jax Bazan       Reason for Sleep Consult:     Nas Trejo is sent by Jax Bazan for a sleep consultation regarding insomnia.    Patient s Reason for visit  Nas Trejo main reason for visit: Need better sleep  Patient states problem(s) started: A year ago  Nas Trejo's goals for this visit: Knowledge           Assessment and Plan:     Summary Sleep Diagnoses & Recommendations:  Patient has features and risk factors for possible obstructive sleep apnea including: loud snoring, gasping for breath, non-refreshing sleep, daytime fatigue, difficulty maintaining sleep and male gender. The STOP-BANG score is 3/8. The pathophysiology, diagnosis and treatment of LAVERN was discussed. Recommend polysomnogram (using 4% desaturation/Medicare/ AASM 1B scoring rules) for intermediate risk obstructive sleep apnea. He will take Hydroxyzine on the night of the sleep study. Patient is a poor candidate for Home Sleep Testing due to not high probability of severe sleep apnea and insomnia.    Insomnia, sleep onset and sleep maintenance, likely due to a variety of factors including inadequate sleep hygiene. Co-occurring anxiety and depression identified and insomnia might be a secondary symptom. Untreated obstructive sleep apnea may be implicated in sleep maintenance difficulties. He is interested in cognitive behavioral treatment and will start by reading the book Say Good Night to  Insomnia.     Summary Recommendations:    Orders Placed This Encounter   Procedures     Comprehensive Sleep Study     Summary Counseling:    Sleep Testing Reviewed  Obstructive Sleep Apnea Reviewed  Complications of Untreated Sleep Apnea Reviewed    Medical Decision-making:   Educational materials provided in instructions    Total time spent reviewing medical records, history and physical examination, review of previous testing and interpretation as well as documentation on this date:46 minutes    CC: Jax Bazan          History of Present Illness:     Nas Trejo is a 42 year old male with history of depression and anxiety. He presents with one year history of difficulty falling asleep. He does not remember a trigger.      Medications tried:   Melatonin, ineffective.     He is on Hydroxyzine prn and finds it helpful with falling asleep, but he does not find sleep refreshing.     SLEEP-WAKE SCHEDULE:     Work/School Days: Patient goes to school/work: Yes   Usually gets into bed at 11pm  Takes patient about 30 to 60 minutes to fall asleep. He reports an active mind.   Has trouble falling asleep 5 nights per week  Wakes up in the middle of the night 1 or 2 times.  Wakes up due to Pain;Uncertain  He has trouble falling back asleep Not horrible times a week.   It usually takes 10min to get back to sleep  Patient is usually up at 7am or earlier  Uses alarm: Yes    Weekends/Non-work Days/All Other Days:  Usually gets into bed at 10pm to 11pm   Takes patient about 30 to 60 minutes to fall asleep  Patient is usually up at 7 to 9 am  Uses alarm: No    Sleep Need  Patient gets  5 to 6 hours sleep on average   Patient thinks he needs about 6 or more sleep    Nas Trejo prefers to sleep in this position(s): Back;Side   Patient states they do the following activities in bed:      Naps  Patient takes a purposeful nap Never no time times a week and naps are usually   in duration  He feels better after a nap:     He dozes off unintentionally None days per week  Patient has had a driving accident or near-miss due to sleepiness/drowsiness: No      SLEEP DISRUPTIONS:    Breathing/Snoring  Patient snores:Yes  Other people complain about his snoring: Yes  Patient has been told he stops breathing in his sleep:No  He has issues with the following: Morning mouth dryness    Movement:  Patient gets pain, discomfort, with an urge to move:  Yes  It happens when he is resting:  Yes  It happens more at night:  No  Patient has been told he kicks his legs at night:  No     Behaviors in Sleep:  Nas Trejo has experienced the following behaviors while sleeping:    He has experienced sudden muscle weakness during the day: No      Is there anything else you would like your sleep provider to know:          CAFFEINE AND OTHER SUBSTANCES:    Patient consumes caffeinated beverages per day:  2  Last caffeine use is usually: 4pm  List of any prescribed or over the counter stimulants that patient takes:    List of any prescribed or over the counter sleep medication patient takes:    List of previous sleep medications that patient has tried:    Patient drinks alcohol to help them sleep: No  Patient drinks alcohol near bedtime: Yes    Family History:  Patient has a family member been diagnosed with a sleep disorder: No            SCALES:    EPWORTH SLEEPINESS SCALE         4/17/2023    10:13 PM    [place] Sleepiness Scale ( ALEXANDRA Churchill  1990-1997[facility]/English - Final version - 21 Nov 07 - [facility] Research [place].)   Sitting and reading Slight chance of dozing   Watching TV Would never doze   Sitting, inactive in a public place (e.g. a theatre or a meeting) Would never doze   As a passenger in a car for an hour without a break Moderate chance of dozing   Lying down to rest in the afternoon when circumstances permit Slight chance of dozing   Sitting and talking to someone Would never doze   Sitting quietly after a lunch without alcohol Slight  chance of dozing   In a car, while stopped for a few minutes in traffic Would never doze   Hastings Score (MC) 5   Hastings Score (Sleep) 5         INSOMNIA SEVERITY INDEX (NISHANT)          4/17/2023    10:03 PM   Insomnia Severity Index (NISHANT)   Difficulty falling asleep 2   Difficulty staying asleep 2   Problems waking up too early 1   How SATISFIED/DISSATISFIED are you with your CURRENT sleep pattern? 3   How NOTICEABLE to others do you think your sleep problem is in terms of impairing the quality of your life? 1   How WORRIED/DISTRESSED are you about your current sleep problem? 1   To what extent do you consider your sleep problem to INTERFERE with your daily functioning (e.g. daytime fatigue, mood, ability to function at work/daily chores, concentration, memory, mood, etc.) CURRENTLY? 2   NISHANT Total Score 12       Guidelines for Scoring/Interpretation:  Total score categories:  0-7 = No clinically significant insomnia   8-14 = Subthreshold insomnia   15-21 = Clinical insomnia (moderate severity)  22-28 = Clinical insomnia (severe)  Used via courtesy of www.[employer] Sciencesth.va.gov with permission from Mario De Souza PhD., [facility]      STOP BANG         4/17/2023    10:13 PM   STOP BANG Questionnaire (  2008, the American Society of Anesthesiologists, Inc. [employer].)   1. Snoring - Do you snore loudly (louder than talking or loud enough to be heard through closed doors)? No   2. Tired - Do you often feel tired, fatigued, or sleepy during daytime? Yes   3. Observed - Has anyone observed you stop breathing during your sleep? No   4. Blood pressure - Do you have or are you being treated for high blood pressure? No   5. BMI - BMI more than 35 kg/m2? No   6. Age - Age over 50 yr old? No   7. Neck circumference - Neck circumference greater than 40 cm? No   8. Gender - Gender male? Yes   STOP BANG Score (MC): 1 (Low risk of LAVERN)         GAD7        12/28/2022     7:06 AM   COY-7    1. Feeling  "nervous, anxious, or on edge 1   2. Not being able to stop or control worrying 1   3. Worrying too much about different things 1   4. Trouble relaxing 0   5. Being so restless that it is hard to sit still 0   6. Becoming easily annoyed or irritable 1   7. Feeling afraid, as if something awful might happen 0   COY-7 Total Score 4   If you checked any problems, how difficult have they made it for you to do your work, take care of things at home, or get along with other people? Not difficult at all         CAGE-AID         View : No data to display.                CAGE-AID reprinted with permission from the [place] Medical Journal, LEROY Mirza. and CORINA Lemus, \"Conjoint screening questionnaires for alcohol and drug abuse\" [place] Medical Journal 94: 135-140, 1995.      PATIENT HEALTH QUESTIONNAIRE-9 (PHQ - 9)        12/28/2022     6:50 AM   PHQ-9 ([employer])   1.  Little interest or pleasure in doing things 1   2.  Feeling down, depressed, or hopeless 1   3.  Trouble falling or staying asleep, or sleeping too much 3   4.  Feeling tired or having little energy 2   5.  Poor appetite or overeating 0   6.  Feeling bad about yourself - or that you are a failure or have let yourself or your family down 0   7.  Trouble concentrating on things, such as reading the newspaper or watching television 1   8.  Moving or speaking so slowly that other people could have noticed. Or the opposite - being so fidgety or restless that you have been moving around a lot more than usual 1   9.  Thoughts that you would be better off dead, or of hurting yourself in some way 0   PHQ-9 Total Score 9   6.  Feeling bad about yourself 0   7.  Trouble concentrating 1   8.  Moving slowly or restless 1   9.  Suicidal or self-harm thoughts 0   1.  Little interest or pleasure in doing things Several days   2.  Feeling down, depressed, or hopeless Several days   3.  Trouble falling or staying asleep, or sleeping too much Nearly every day   4.  Feeling " tired or having little energy More than half the days   5.  Poor appetite or overeating Not at all   6.  Feeling bad about yourself Not at all   7.  Trouble concentrating Several days   8.  Moving slowly or restless Several days   9.  Suicidal or self-harm thoughts Not at all   PHQ[facility] via [facility] TOTAL SCORE-----> 9 (Mild depression)   Difficulty at work, home, or with people Not difficult at all       Developed by Leigh Hammond, Yolanda Montes, Camacho Sims and colleagues, with an educational belle from [employer]. No permission required to reproduce, translate, display or distribute.        Allergies:    No Known Allergies    Medications:    Current Outpatient Medications   Medication Sig Dispense Refill     citalopram (CELEXA) 20 MG tablet Take 1 tablet (20 mg) by mouth daily Disregard previous 40 mg dose. 90 tablet 3     desoximetasone (TOPICORT) 0.25 % external cream Apply sparingly to affected area twice daily 60 g 3     guaiFENesin (ORGANIDIN) 200 MG tablet Take 1 tablet (200 mg) by mouth every 4 hours as needed for cough (Patient not taking: Reported on 12/28/2022)       hydrOXYzine (ATARAX) 25 MG tablet Take 1-2 tablets (25-50 mg) by mouth nightly as needed for other (insomnia) (Patient not taking: Reported on 4/6/2023) 30 tablet 0       Problem List:  Patient Active Problem List    Diagnosis Date Noted     Mild major depression (H) 04/18/2023     Priority: Medium     Generalized anxiety disorder 04/17/2023     Priority: Medium     Eczema, unspecified type 10/19/2016     Priority: Medium        Past Medical/Surgical History:  No past medical history on file.  Past Surgical History:   Procedure Laterality Date     ENT SURGERY       HERNIA REPAIR  01/01/2006     TONSILLECTOMY         Social History:  Social History     Socioeconomic History     Marital status:      Spouse name: Not on file     Number of children: Not on file     Years of education: Not on file     Highest education level:  Not on file   Occupational History     Occupation: [employer]   Tobacco Use     Smoking status: Former     Packs/day: 0.50     Years: 20.00     Pack years: 10.00     Types: Cigarettes     Quit date: 2018     Years since quittin.2     Smokeless tobacco: Current     Types: Chew   Vaping Use     Vaping status: Never Used   Substance and Sexual Activity     Alcohol use: Yes     Comment: social     Drug use: No     Sexual activity: Yes     Partners: Male   Other Topics Concern     Parent/sibling w/ CABG, MI or angioplasty before 65F 55M? No   Social History Narrative     Not on file     Social Determinants of Health     Financial Resource Strain: Not on file   Food Insecurity: Not on file   Transportation Needs: Not on file   Physical Activity: Not on file   Stress: Not on file   Social Connections: Not on file   Intimate Partner Violence: Not on file   Housing Stability: Not on file       Family History:  Family History   Problem Relation Age of Onset     Diabetes Father         type 2     Thyroid Disease Sister        Review of Systems:  A complete review of systems reviewed by me is negative with the exeption of what has been mentioned in the history of present illness.  In the last TWO WEEKS have you experienced any of the following symptoms?  Fevers: No  Night Sweats: No  Weight Gain: No  Pain at Night: Yes  Double Vision: No  Changes in Vision: No  Difficulty Breathing through Nose: No  Sore Throat in Morning: No  Dry Mouth in the Morning: Yes  Shortness of Breath Lying Flat: No  Shortness of Breath With Activity: Yes  Awakening with Shortness of Breath: No  Increased Cough: No  Heart Racing at Night: No  Swelling in Feet or Legs: No  Diarrhea at Night: No  Heartburn at Night: No  Urinating More than Once at Night: No  Losing Control of Urine at Night: No  Joint Pains at Night: Yes  Headaches in Morning: No  Weakness in Arms or Legs: No  Depressed Mood: Yes  Anxiety: Yes     Physical  Examination:  Vitals: There were no vitals taken for this visit.  BMI= There is no height or weight on file to calculate BMI.           GENERAL APPEARANCE: alert and no distress  EYES: Eyes grossly normal to inspection  NECK: no asymmetry, masses, or scars  RESP: breathing is non-labored  NEURO: mentation intact and speech normal  PSYCH: affect normal/bright  Mallampati Class:   Tonsillar Stage:          Data: All pertinent previous laboratory data reviewed     Recent Labs   Lab Test 12/28/22 0741 12/28/19  2209 10/22/18  0911   NA  --  138  --    POTASSIUM  --  5.1  --    CHLORIDE  --  105  --    CO2  --  30  --    ANIONGAP  --  3  --    * 89  --    BUN  --  12  --    CR  --  0.72 0.88   DIOGO  --  8.8  --        Recent Labs   Lab Test 12/28/19 2209   WBC 8.8   RBC 4.83   HGB 15.0   HCT 44.5   MCV 92   MCH 31.1   MCHC 33.7   RDW 11.6          Recent Labs   Lab Test 12/28/19 2209   PROTTOTAL 7.8   ALBUMIN 4.1   BILITOTAL 0.3   ALKPHOS 68   AST 65*   ALT 78*       Kerrie Olvera PA-C 4/18/2023

## 2023-04-18 NOTE — NURSING NOTE
Is the patient currently in the state of [place]? YES    Visit mode:VIDEO    If the visit is dropped, the patient can be reconnected by: VIDEO VISIT: Send to e-mail at: [email or web address]    Will anyone else be joining the visit? NO      How would you like to obtain your AVS? MyChart    Are changes needed to the allergy or medication list? NO    Reason for visit: Video Visit (insomnia)

## 2023-05-12 ENCOUNTER — HOSPITAL ENCOUNTER (OUTPATIENT)
Dept: PHYSICAL THERAPY | Facility: CLINIC | Age: 43
Setting detail: THERAPIES SERIES
Discharge: HOME | End: 2023-05-12
Attending: FAMILY MEDICINE
Payer: COMMERCIAL

## 2023-05-12 PROCEDURE — 97110 THERAPEUTIC EXERCISES: CPT | Mod: GP | Performed by: PHYSICAL THERAPIST

## 2023-05-12 PROCEDURE — 97161 PT EVAL LOW COMPLEX 20 MIN: CPT | Mod: GP | Performed by: PHYSICAL THERAPIST

## 2023-05-12 NOTE — PROGRESS NOTES
PHYSICAL THERAPY INITIAL EVALUATION  05/12/23 1300   General Information   Type of Visit Initial OP Ortho PT Evaluation   Start of Care Date 05/12/23   Referring Physician Sudhir Kaplan, DO   Patient/Family Goals Statement get more information on what kinds of things he should be doing   Orders Evaluate and Treat   Orders Comment pelvic obliquity and lateral hip pain, chronic limp, no significant intraarticular pathology on imaging or exam, work on core buidling HEP, hip stabilization, gait retraining   Date of Order 04/06/23   Certification Required? No   Medical Diagnosis Pain of left hip (M25.552)  - Primary     Hx of stress fracture (Z87.312)     Pelvic obliquity (M95.5)     Gluteal tendinitis of left buttock (M76.02)   Surgical/Medical history reviewed Yes   Precautions/Limitations no known precautions/limitations   Body Part(s)   Body Part(s) Hip   Presentation and Etiology   Pertinent history of current problem (include personal factors and/or comorbidities that impact the POC) Hx of stress fracture when in the  years ago. Current onset of symptoms past 9-12 months. Pain in L lateral hip, saw ortho and thought more muscular/tendon releated. Symptoms have improved in the last month. Running with his dog every other day, 1-3 miles.   Impairments A. Pain;E. Decreased flexibility   Functional Limitations perform activities of daily living;perform required work activities;perform desired leisure / sports activities   Symptom Location L lateral hip   How/Where did it occur With repetition/overuse   Onset date of current episode/exacerbation 05/12/22   Chronicity Recurrent   Pain rating (0-10 point scale) Best (/10);Worst (/10)   Best (/10) 0   Worst (/10) 2   Pain quality B. Dull;C. Aching   Frequency of pain/symptoms B. Intermittent   Pain/symptoms exacerbated by G. Certain positions   Pain/symptoms eased by D. Nothing   Progression of symptoms since onset: Improved   Prior Level of Function    Prior Level of Function-Mobility independent, occasional golf, fishing   Prior Level of Function-ADLs independent   Current Level of Function   Patient role/employment history A. Employed   Employment Comments    Fall Risk Screen   Fall screen completed by PT   Have you fallen 2 or more times in the past year? No   Have you fallen and had an injury in the past year? No   Is patient a fall risk? No   Abuse Screen (yes response referral indicated)   Feels Unsafe at Home or Work/School no   Feels Threatened by Someone no   Does Anyone Try to Keep You From Having Contact with Others or Doing Things Outside Your Home? no   Physical Signs of Abuse Present no   Patient needs abuse support services and resources No   Functional Scales   Functional Scales Other   Other Scales  LEFS: 80/80   Hip Objective Findings   Gait/Locomotion no significant limp on exam today, hyperpronation B   Side (if bilateral, select both right and left) Right;Left   Pelvic Screen + supine to sit, - thigh thurst, - SI compression   Functional Closed Chain Screen Single leg squat on R good, L instability with femoral IR and adduction   Straight Leg Raise Test -   Scour Test -   ABEL Test -, pt reported feeling tight on the left   FADIR Test -   Palpation tender glute med/min tendon insertion on greater trochanter   Right Hamstring Flexibility wnl   Left Hamstring Flexibility wnl   Right Piriformis Flexibility wnl   Left Piriformis Flexibility wnl   Right Prone Quad Flexibility wnl   Left Prone Quad Flexibility wnl   Right Hip Flexion PROM WNL   Left Hip Flexion PROM WNL   Right Hip ER PROM WNL   Left Hip ER PROM WNL   Right Hip IR PROM WNL   Left Hip IR PROM WNL, some pain lateral hip with testing   Right Hip Ext PROM WNL   Left Hip Ext PROM WNL   Right Hip Flexion Strength 5   Left Hip Flexion Strength 5   Right Hip Abduction Strength 5   Left Hip Abduction Strength 5-/5   Right Hip IR Strength 5   Left Hip IR Strength 5, pain    Right Hip ER Strength 5   Left Hip ER Strength 5   Right Knee Flexion Strength 5   Left Knee Flexion Strength 5   Right Knee Extension Strength 5   Left Knee Extension Strength 5   Planned Therapy Interventions   Planned Therapy Interventions balance training;gait training;joint mobilization;manual therapy;neuromuscular re-education;ROM;stretching;strengthening   Clinical Impression   Criteria for Skilled Therapeutic Interventions Met yes, treatment indicated   PT Diagnosis L hip pain   Influenced by the following impairments pain, decreased strength, decreased flexibility   Functional limitations due to impairments certain positions   Clinical Presentation Stable/Uncomplicated   Clinical Presentation Rationale sx stable, improving   Clinical Decision Making (Complexity) Low complexity   Therapy Frequency 1 time/week   Predicted Duration of Therapy Intervention (days/wks) 4 weeks, 1x every other week 4 weeks   Risk & Benefits of therapy have been explained Yes   Patient, Family & other staff in agreement with plan of care Yes   Education Assessment   Preferred Learning Style Listening;Demonstration;Pictures/video   Barriers to Learning No barriers   ORTHO GOALS   PT Ortho Eval Goals 1;2;3   Ortho Goal 1   Goal Identifier 1   Goal Description Patient will report no pain with rotational/twisting movements to be able to perform household and works tasks without difficulty.   Target Date 07/07/23   Ortho Goal 2   Goal Identifier 2   Goal Description Patient will be independetn and consistent with HEP 5x a week to aid functional recovery.   Target Date 07/07/23   Total Evaluation Time   PT Eval, Low Complexity Minutes ([id number]) 15       Please contact me with any questions or concerns.  Thank you for your referral.    Radha Thornton, PT, DPT, OCS  Physical Therapist, Orthopedic Certified Specialist    [facility]  [address]   [address] [place] [address]  [id number]  [email or web address]   Office: [phone]   Employed by [employer]

## 2023-05-12 NOTE — PROGRESS NOTES
PHYSICAL THERAPY INITIAL EVALUATION  05/12/23 1300   General Information   Type of Visit Initial OP Ortho PT Evaluation   Start of Care Date 05/12/23   Referring Physician Sudhir Kaplan, DO   Patient/Family Goals Statement get more information on what kinds of things he should be doing   Orders Evaluate and Treat   Orders Comment pelvic obliquity and lateral hip pain, chronic limp, no significant intraarticular pathology on imaging or exam, work on core buidling HEP, hip stabilization, gait retraining   Date of Order 04/06/23   Certification Required? No   Medical Diagnosis Pain of left hip (M25.552)  - Primary     Hx of stress fracture (Z87.312)     Pelvic obliquity (M95.5)     Gluteal tendinitis of left buttock (M76.02)   Surgical/Medical history reviewed Yes   Precautions/Limitations no known precautions/limitations   Body Part(s)   Body Part(s) Hip   Presentation and Etiology   Pertinent history of current problem (include personal factors and/or comorbidities that impact the POC) Hx of stress fracture when in the  years ago. Current onset of symptoms past 9-12 months. Pain in L lateral hip, saw ortho and thought more muscular/tendon releated. Symptoms have improved in the last month. Running with his dog every other day, 1-3 miles.   Impairments A. Pain;E. Decreased flexibility   Functional Limitations perform activities of daily living;perform required work activities;perform desired leisure / sports activities   Symptom Location L lateral hip   How/Where did it occur With repetition/overuse   Onset date of current episode/exacerbation 05/12/22   Chronicity Recurrent   Pain rating (0-10 point scale) Best (/10);Worst (/10)   Best (/10) 0   Worst (/10) 2   Pain quality B. Dull;C. Aching   Frequency of pain/symptoms B. Intermittent   Pain/symptoms exacerbated by G. Certain positions   Pain/symptoms eased by D. Nothing   Progression of symptoms since onset: Improved   Prior Level of Function    Prior Level of Function-Mobility independent, occasional golf, fishing   Prior Level of Function-ADLs independent   Current Level of Function   Patient role/employment history A. Employed   Employment Comments    Fall Risk Screen   Fall screen completed by PT   Have you fallen 2 or more times in the past year? No   Have you fallen and had an injury in the past year? No   Is patient a fall risk? No   Abuse Screen (yes response referral indicated)   Feels Unsafe at Home or Work/School no   Feels Threatened by Someone no   Does Anyone Try to Keep You From Having Contact with Others or Doing Things Outside Your Home? no   Physical Signs of Abuse Present no   Patient needs abuse support services and resources No   Functional Scales   Functional Scales Other   Other Scales  LEFS: 80/80   Hip Objective Findings   Gait/Locomotion no significant limp on exam today, hyperpronation B   Side (if bilateral, select both right and left) Right;Left   Pelvic Screen + supine to sit, - thigh thurst, - SI compression   Straight Leg Raise Test -   Scour Test -   ABEL Test -, pt reported feeling tight on the left   FADIR Test -   Palpation tender glute med/min tendon insertion on greater trochanter   Right Hamstring Flexibility wnl   Left Hamstring Flexibility wnl   Right Piriformis Flexibility wnl   Left Piriformis Flexibility wnl   Right Prone Quad Flexibility wnl   Left Prone Quad Flexibility wnl   Right Hip Flexion PROM WNL   Left Hip Flexion PROM WNL   Right Hip ER PROM WNL   Left Hip ER PROM WNL   Right Hip IR PROM WNL   Left Hip IR PROM WNL, some pain lateral hip with testing   Right Hip Ext PROM WNL   Left Hip Ext PROM WNL   Right Hip Flexion Strength 5   Left Hip Flexion Strength 5   Right Hip Abduction Strength 5   Left Hip Abduction Strength 5-/5   Right Hip IR Strength 5   Left Hip IR Strength 5, pain   Right Hip ER Strength 5   Left Hip ER Strength 5   Right Knee Flexion Strength 5   Left Knee Flexion  Strength 5   Right Knee Extension Strength 5   Left Knee Extension Strength 5   Planned Therapy Interventions   Planned Therapy Interventions balance training;gait training;joint mobilization;manual therapy;neuromuscular re-education;ROM;stretching;strengthening   Clinical Impression   Criteria for Skilled Therapeutic Interventions Met yes, treatment indicated   PT Diagnosis L hip pain   Influenced by the following impairments pain, decreased strength, decreased flexibility   Functional limitations due to impairments certain positions   Clinical Presentation Stable/Uncomplicated   Clinical Presentation Rationale sx stable, improving   Clinical Decision Making (Complexity) Low complexity   Therapy Frequency 1 time/week   Predicted Duration of Therapy Intervention (days/wks) 4 weeks, 1x every other week 4 weeks   Risk & Benefits of therapy have been explained Yes   Patient, Family & other staff in agreement with plan of care Yes   Education Assessment   Preferred Learning Style Listening;Demonstration;Pictures/video   Barriers to Learning No barriers   ORTHO GOALS   PT Ortho Eval Goals 1;2;3   Ortho Goal 1   Goal Identifier 1   Goal Description Patient will report no pain with rotational/twisting movements to be able to perform household and works tasks without difficulty.   Target Date 07/07/23   Ortho Goal 2   Goal Identifier 2   Goal Description Patient will be independetn and consistent with HEP 5x a week to aid functional recovery.   Target Date 07/07/23   Total Evaluation Time   PT Eval, Low Complexity Minutes (80388) 15       Please contact me with any questions or concerns.  Thank you for your referral.    Radha Thornton, PT, DPT, OCS  Physical Therapist, Orthopedic Certified Specialist    [facility]  [address] [place] [id number]  [email or web address]   Office: [phone]   Employed by [facility]

## 2023-05-19 ENCOUNTER — THERAPY VISIT (OUTPATIENT)
Dept: PHYSICAL THERAPY | Facility: CLINIC | Age: 43
End: 2023-05-19
Attending: FAMILY MEDICINE
Payer: COMMERCIAL

## 2023-05-19 DIAGNOSIS — M76.02 GLUTEAL TENDINITIS OF LEFT BUTTOCK: ICD-10-CM

## 2023-05-19 DIAGNOSIS — M25.552 PAIN OF LEFT HIP: ICD-10-CM

## 2023-05-19 DIAGNOSIS — Z87.312 HX OF STRESS FRACTURE: ICD-10-CM

## 2023-05-19 PROCEDURE — 97110 THERAPEUTIC EXERCISES: CPT | Mod: GP

## 2023-06-01 ENCOUNTER — THERAPY VISIT (OUTPATIENT)
Dept: PHYSICAL THERAPY | Facility: CLINIC | Age: 43
End: 2023-06-01
Attending: FAMILY MEDICINE
Payer: COMMERCIAL

## 2023-06-01 DIAGNOSIS — M76.02 GLUTEAL TENDINITIS OF LEFT BUTTOCK: ICD-10-CM

## 2023-06-01 DIAGNOSIS — Z87.312 HX OF STRESS FRACTURE: ICD-10-CM

## 2023-06-01 DIAGNOSIS — M25.552 PAIN OF LEFT HIP: Primary | ICD-10-CM

## 2023-06-01 PROCEDURE — 97110 THERAPEUTIC EXERCISES: CPT | Mod: GP | Performed by: PHYSICAL THERAPIST

## 2023-11-28 ENCOUNTER — HOSPITAL ENCOUNTER (EMERGENCY)
Facility: CLINIC | Age: 43
Discharge: HOME | End: 2023-11-28
Attending: EMERGENCY MEDICINE | Admitting: EMERGENCY MEDICINE
Payer: COMMERCIAL

## 2023-11-28 ENCOUNTER — PATIENT OUTREACH (OUTPATIENT)
Dept: CARE COORDINATION | Facility: CLINIC | Age: 43
End: 2023-11-28
Payer: COMMERCIAL

## 2023-11-28 ENCOUNTER — APPOINTMENT (OUTPATIENT)
Dept: CT IMAGING | Facility: CLINIC | Age: 43
End: 2023-11-28
Attending: EMERGENCY MEDICINE
Payer: COMMERCIAL

## 2023-11-28 VITALS
RESPIRATION RATE: 18 BRPM | HEART RATE: 96 BPM | BODY MASS INDEX: 25.77 KG/M2 | TEMPERATURE: 98 F | WEIGHT: 180 LBS | SYSTOLIC BLOOD PRESSURE: 129 MMHG | OXYGEN SATURATION: 99 % | HEIGHT: 70 IN | DIASTOLIC BLOOD PRESSURE: 72 MMHG

## 2023-11-28 DIAGNOSIS — S01.01XA SCALP LACERATION, INITIAL ENCOUNTER: ICD-10-CM

## 2023-11-28 DIAGNOSIS — S09.90XA CLOSED HEAD INJURY, INITIAL ENCOUNTER: ICD-10-CM

## 2023-11-28 PROCEDURE — 99284 EMERGENCY DEPT VISIT MOD MDM: CPT | Mod: 25 | Performed by: EMERGENCY MEDICINE

## 2023-11-28 PROCEDURE — 70450 CT HEAD/BRAIN W/O DYE: CPT

## 2023-11-28 PROCEDURE — 12002 RPR S/N/AX/GEN/TRNK2.6-7.5CM: CPT | Performed by: EMERGENCY MEDICINE

## 2023-11-28 PROCEDURE — 99283 EMERGENCY DEPT VISIT LOW MDM: CPT | Mod: 25 | Performed by: EMERGENCY MEDICINE

## 2023-11-28 ASSESSMENT — ENCOUNTER SYMPTOMS
ALLERGIC/IMMUNOLOGIC NEGATIVE: 1
HEMATOLOGIC/LYMPHATIC NEGATIVE: 1
MUSCULOSKELETAL NEGATIVE: 1
RESPIRATORY NEGATIVE: 1
HEADACHES: 1
EYES NEGATIVE: 1
CARDIOVASCULAR NEGATIVE: 1
GASTROINTESTINAL NEGATIVE: 1
PSYCHIATRIC NEGATIVE: 1
ENDOCRINE NEGATIVE: 1
WOUND: 1

## 2023-11-28 ASSESSMENT — ACTIVITIES OF DAILY LIVING (ADL): ADLS_ACUITY_SCORE: 35

## 2023-11-28 NOTE — ED PROVIDER NOTES
History     Chief Complaint   Patient presents with    Fall    Head Laceration     HPI  Nas Trejo is a 43 year old male who presents for evaluation for scalp wound after sustaining head injury after fall  the night prior.  Patient reported on intake that he was at the [employer] game when he he fell sustaining his head injury.  He admitted that he been drinking alcohol     On examination patient was alone in the department reporting that he had had quite a few drinks last night and he slipped on the ice bucket out of the [employer] game.  He does not recall details of the fall.  There was bleeding through the night over his scalp wound.  He presented for wound care and for further assessment.  He reports a mild headache.  His fall was witnessed by his wife.  He is on Effexor no medication allergies.  Works as a .    Allergies:  No Known Allergies    Problem List:    Patient Active Problem List    Diagnosis Date Noted    Pain of left hip 2023     Priority: Medium    Hx of stress fracture 2023     Priority: Medium    Gluteal tendinitis of left buttock 2023     Priority: Medium    Mild major depression (H24) 2023     Priority: Medium    Generalized anxiety disorder 2023     Priority: Medium    Eczema, unspecified type 10/19/2016     Priority: Medium        Past Medical History:    No past medical history on file.    Past Surgical History:    Past Surgical History:   Procedure Laterality Date    ENT SURGERY      HERNIA REPAIR  2006    TONSILLECTOMY         Family History:    Family History   Problem Relation Age of Onset    Diabetes Father         type 2    Thyroid Disease Sister        Social History:  Marital Status:   [2]  Social History     Tobacco Use    Smoking status: Former     Packs/day: 0.50     Years: 20.00     Additional pack years: 0.00     Total pack years: 10.00     Types: Cigarettes     Quit date: 2018     Years since quittin.9     "Smokeless tobacco: Current     Types: Chew   Vaping Use    Vaping Use: Never used   Substance Use Topics    Alcohol use: Yes     Comment: social    Drug use: No        Medications:    citalopram (CELEXA) 20 MG tablet  desoximetasone (TOPICORT) 0.25 % external cream  guaiFENesin (ORGANIDIN) 200 MG tablet  hydrOXYzine (ATARAX) 25 MG tablet          Review of Systems   Constitutional:         Slip and fall on the ice the night prior   HENT: Negative.     Eyes: Negative.    Respiratory: Negative.     Cardiovascular: Negative.    Gastrointestinal: Negative.    Endocrine: Negative.    Genitourinary: Negative.    Musculoskeletal: Negative.    Skin:  Positive for wound (scalp wound).   Allergic/Immunologic: Negative.    Neurological:  Positive for headaches.   Hematological: Negative.    Psychiatric/Behavioral: Negative.     All other systems reviewed and are negative.      Physical Exam   BP: 129/72  Pulse: 96  Temp: 98  F (36.7  C)  Resp: 18  Height: 177.8 cm (5' 10\")  Weight: 81.6 kg (180 lb)  SpO2: 99 %      Physical Exam  Constitutional:       Appearance: He is not ill-appearing, toxic-appearing or diaphoretic.   HENT:      Head: Normocephalic. Contusion and laceration present.        Nose: Nose normal.   Eyes:      Extraocular Movements: Extraocular movements intact.      Pupils: Pupils are equal, round, and reactive to light.   Cardiovascular:      Rate and Rhythm: Normal rate and regular rhythm.   Pulmonary:      Effort: Pulmonary effort is normal.   Musculoskeletal:         General: No swelling, tenderness, deformity or signs of injury.      Right lower leg: No edema.      Left lower leg: No edema.   Skin:     Capillary Refill: Capillary refill takes less than 2 seconds.      Coloration: Skin is not jaundiced or pale.      Findings: No bruising, erythema, lesion or rash.   Neurological:      General: No focal deficit present.      Mental Status: He is alert and oriented to person, place, and time.   Psychiatric:    " "     Mood and Affect: Mood normal.         Behavior: Behavior normal.         Thought Content: Thought content normal.         Judgment: Judgment normal.         ED Course                 Procedures              Critical Care time:  none               ED medications: none      ED vitals:  Vitals:    11/28/23 0930   BP: 129/72   Pulse: 96   Resp: 18   Temp: 98  F (36.7  C)   TempSrc: Tympanic   SpO2: 99%   Weight: 81.6 kg (180 lb)   Height: 1.778 m (5' 10\")     ED labs and imaging:  Results for orders placed or performed during the hospital encounter of 11/28/23   Head CT w/o contrast     Status: None    Narrative    EXAM: CT HEAD W/O CONTRAST  11/28/2023 10:33 AM     HISTORY: Fall while intoxicated last night. Parietal-occipital  hematoma, headache. Evaluate for acute intracranial process after  blunt trauma       COMPARISON: 2/21/2022    TECHNIQUE: Using multidetector thin collimation helical acquisition  technique, axial, coronal and sagittal CT images from the skull base  to the vertex were obtained without intravenous contrast.   (topogram) image(s) also obtained and reviewed. Dose reduction  techniques were used.    FINDINGS:  No acute intracranial hemorrhage, mass effect, or midline shift. No CT  findings of acute infarct or hydrocephalus. Preserved subarachnoid  spaces.    Right posterior parietal scalp hematoma. No underlying fracture. No  substantial paranasal sinus mucosal disease. Clear mastoid air cells.  Nonfocal orbits.       Impression    IMPRESSION:   1. No CT evidence for acute intracranial hemorrhage.  2. Right parietal scalp hematoma. No underlying calvarial fracture.    LO RAMSAY DO         SYSTEM ID:  [id number]       Assessments & Plan (with Medical Decision Making)   Assessment Summary and Clinical Impression: 43-year-old male who presented for evaluation after head injury with a scalp laceration.  Patient reported he had fallen yesterday while leaving the [employer] game.  Patient " reports he slipped on ice walking outside.  He had a few drinks. Fall was witnessed.  Patient does not recall details. On exam patient was afebrile, GCS was 15.  Hemodynamically normal.  Reviewed the role of head imaging given head trauma while intoxicated after discussion about indication for imaging including potential for bleed patient elected to proceed with imaging.  His scalp wound was not gaping and there was no active bleeding  and there was no obvious wound that required immediate suture closure  Head CT: No skull fracture or intracranial hemorrhage.  Patient's scalp wound was irrigated.  There was no gaping wound that required suture closure.  I applied a strip of Dermabond to both scalp wounds and covered with bacitracin.  Patient was advised to keep the wound clean and wound care instructions were provided.  We also discussed concerning symptoms including reasons to return for evaluation    ED course and Plan:  Reviewed the medical record.  After reviewing indications for head imaging patient agreed to proceed with head CT.  Head CT revealed revealed no skull fracture or intracranial hemorrhage. The wound was reexamined after head imaging, no gaping wounds requiring suture closure or stapling.  Wound edges covered with a strip of Dermabond glue and bacitracin.  Patient discharged with plan to return if there are new concerns and wound care instructions including signs of infection reviewed with the patient.      ADDENDUM- on 12/1/23- coding and billing feedback- addition of wound length. Two separate wounds with avulsed tissue measuring 2cm. Total wound length ~4 cm.    Disclaimer: This note consists of symbols derived from keyboarding, dictation and/or voice recognition software. As a result, there may be errors in the script that have gone undetected. Please consider this when interpreting information found in this chart.   I have reviewed the nursing notes.    I have reviewed the findings, diagnosis,  plan and need for follow up with the patient.           Medical Decision Making  The patient's presentation was of moderate complexity (an acute complicated injury).    The patient's evaluation involved:  ordering and/or review of 1 test(s) in this encounter (diagnostic imaging)    The patient's management necessitated moderate risk (prescription drug management including medications given in the ED).        Discharge Medication List as of 11/28/2023 11:34 AM          Final diagnoses:   Scalp laceration, initial encounter - right parietal- 2 gaping wounds ~ 2 cm each   Closed head injury, initial encounter - on 11/28/23 11/28/2023   [facility] EMERGENCY DEPT       Pratik Rubalcava MD  11/28/23 144       Pratik Rubalcava MD  12/01/23 4782

## 2023-11-28 NOTE — ED TRIAGE NOTES
"Pt here with head pain and lacerations to the posterior head after a fall on ice leaving the [employer] game last night. Pt reports having \"a few beers\" prior to falling. Pt unsure about LOC, was with his wife when he fell. Pt is not on thinners.      Triage Assessment (Adult)       Row Name 11/28/23 0931          Triage Assessment    Airway WDL WDL        Respiratory WDL    Respiratory WDL WDL        Skin Circulation/Temperature WDL    Skin Circulation/Temperature WDL WDL        Cardiac WDL    Cardiac WDL WDL        Peripheral/Neurovascular WDL    Peripheral Neurovascular WDL WDL        Cognitive/Neuro/Behavioral WDL    Cognitive/Neuro/Behavioral WDL WDL                     "

## 2023-11-28 NOTE — ED NOTES
Pt presents to ED with concerns of a head injury. Pt reports he was leaving the [employer] game last night he slipped on the ice, fell and hit the back of his head. 2 small 2 cm lac to back of head, bleeding controlled open to air. No other concerns.

## 2023-11-28 NOTE — DISCHARGE INSTRUCTIONS
1) Your evaluation revealed that you sustained a scalp wound after a fall that did not require suture closure or stapling.  There is an open gash that was covered with a strip of glue and antibiotic ointment.  Head imaging not show any evidence for skull fracture or bleeding in the brain.    2) Keep the wound clean.  Apply antibiotic ointment at least twice a day over the next few days.  We discussed the importance of reestablishing primary care to follow-up on further lab testing as suggested by her spouse.

## 2023-11-30 ENCOUNTER — NURSE TRIAGE (OUTPATIENT)
Dept: NURSING | Facility: CLINIC | Age: 43
End: 2023-11-30
Payer: COMMERCIAL

## 2023-11-30 NOTE — TELEPHONE ENCOUNTER
"Patient calling wondering how long \"Rufies\" stay in your system.  Patient will call Poison Control for further information.     GILL BOYER RN    Reason for Disposition   Triager unable to answer question    Additional Information   Negative: SEVERE difficulty breathing (e.g., struggling for each breath, speaks in single words)   Negative: Bluish (or gray) lips or face   Negative: Slow, shallow and weak breathing   Negative: Difficult to awaken or acting confused (e.g., disoriented, slurred speech)   Negative: Seizure   Negative: Shock suspected (e.g., cold/pale/clammy skin, too weak to stand, low BP, rapid pulse)   Negative: Suicide attempt, known or suspected   Negative: Intentional overdose and suicidal thoughts or ideas   Negative: Sounds like a life-threatening emergency to the triager   Negative: Chemical in the eye   Negative: Chemical on the skin   Negative: Epinephrine (such as from Epi-Pen) accidental injection   Negative: CAUSTIC ACID OR ALKALI ingestion (e.g., toilet , drain , lye, Clinitest tablets, ammonia, bleaches) AND any symptoms (e.g., difficulty breathing, drooling, mouth pain, sore throat, stridor)   Negative: HYDROCARBON PRODUCT ingestion (e.g., kerosene, gasoline, benzene, furniture polish, lighter fluid) AND any symptoms (e.g., coughing, difficulty breathing, vomiting)   Negative: Poison Center advised patient to go to ED and caller seeking second opinion   Negative: Patient sounds very sick or weak to the triager   Negative: CAUSTIC ACID OR ALKALI ingestion (e.g., toilet , drain , lye, Clinitest tablets, ammonia, bleaches) AND NO symptoms   Negative: HYDROCARBON PRODUCT ingestion (e.g., kerosene, gasoline, benzene, furniture polish, lighter fluid) AND NO symptoms   Negative: Carbon monoxide exposure suspected   Negative: MORE THAN A DOUBLE DOSE of a prescription or over-the-counter (OTC) drug   Negative: DOUBLE DOSE (an extra dose or lesser amount) of " prescription drug and any symptoms (e.g., dizziness, nausea, pain, sleepiness)   Negative: DOUBLE DOSE (an extra dose or lesser amount) of over-the-counter (OTC) drug and any symptoms (e.g., dizziness, nausea, pain, sleepiness)   Negative: Took another person's prescription drug   Negative: Mercury spill (e.g., broken glass thermometer, broken spiral CFL lightbulb)   Negative: Wild mushroom ingestion, questions about   Negative: All other POTENTIALLY POISONOUS SUBSTANCES (e.g., nearly all chemicals, plants) (Exception: Known harmless substances or asymptomatic double dose of OTC drug.)    Protocols used: Poisoning-A-[place]

## 2023-12-12 ENCOUNTER — PATIENT OUTREACH (OUTPATIENT)
Dept: CARE COORDINATION | Facility: CLINIC | Age: 43
End: 2023-12-12
Payer: COMMERCIAL

## 2024-02-05 ENCOUNTER — HOSPITAL ENCOUNTER (EMERGENCY)
Facility: CLINIC | Age: 44
End: 2024-02-05

## 2024-03-17 ENCOUNTER — HEALTH MAINTENANCE LETTER (OUTPATIENT)
Age: 44
End: 2024-03-17

## 2025-03-22 ENCOUNTER — HEALTH MAINTENANCE LETTER (OUTPATIENT)
Age: 45
End: 2025-03-22